# Patient Record
Sex: MALE | Race: WHITE | NOT HISPANIC OR LATINO | Employment: FULL TIME | URBAN - METROPOLITAN AREA
[De-identification: names, ages, dates, MRNs, and addresses within clinical notes are randomized per-mention and may not be internally consistent; named-entity substitution may affect disease eponyms.]

---

## 2017-03-22 ENCOUNTER — ALLSCRIPTS OFFICE VISIT (OUTPATIENT)
Dept: OTHER | Facility: OTHER | Age: 47
End: 2017-03-22

## 2018-10-29 ENCOUNTER — OFFICE VISIT (OUTPATIENT)
Dept: FAMILY MEDICINE CLINIC | Facility: CLINIC | Age: 48
End: 2018-10-29
Payer: COMMERCIAL

## 2018-10-29 VITALS
HEIGHT: 67 IN | TEMPERATURE: 97.2 F | BODY MASS INDEX: 24.01 KG/M2 | DIASTOLIC BLOOD PRESSURE: 72 MMHG | SYSTOLIC BLOOD PRESSURE: 128 MMHG | WEIGHT: 153 LBS

## 2018-10-29 DIAGNOSIS — K64.1 GRADE II HEMORRHOIDS: Primary | ICD-10-CM

## 2018-10-29 DIAGNOSIS — K64.1 GRADE II HEMORRHOIDS: ICD-10-CM

## 2018-10-29 PROCEDURE — 99213 OFFICE O/P EST LOW 20 MIN: CPT | Performed by: FAMILY MEDICINE

## 2018-10-29 RX ORDER — TRAMADOL HYDROCHLORIDE 50 MG/1
50 TABLET ORAL EVERY 8 HOURS PRN
Qty: 10 TABLET | Refills: 0 | Status: ON HOLD | OUTPATIENT
Start: 2018-10-29 | End: 2019-05-17 | Stop reason: ALTCHOICE

## 2018-10-29 RX ORDER — CEPHALEXIN 500 MG/1
500 CAPSULE ORAL EVERY 6 HOURS SCHEDULED
Qty: 14 CAPSULE | Refills: 0 | Status: SHIPPED | OUTPATIENT
Start: 2018-10-29 | End: 2018-10-29

## 2018-10-29 RX ORDER — CEPHALEXIN 500 MG/1
500 CAPSULE ORAL EVERY 6 HOURS SCHEDULED
Qty: 28 CAPSULE | Refills: 0 | Status: SHIPPED | OUTPATIENT
Start: 2018-10-29 | End: 2018-11-05

## 2018-10-29 RX ORDER — IBUPROFEN 400 MG/1
TABLET ORAL EVERY 6 HOURS PRN
Status: ON HOLD | COMMUNITY
End: 2019-05-17 | Stop reason: ALTCHOICE

## 2018-10-29 RX ORDER — TRAMADOL HYDROCHLORIDE 50 MG/1
50 TABLET ORAL EVERY 8 HOURS PRN
Qty: 10 TABLET | Refills: 0 | Status: SHIPPED | OUTPATIENT
Start: 2018-10-29 | End: 2018-10-29 | Stop reason: SDUPTHER

## 2018-10-29 NOTE — TELEPHONE ENCOUNTER
Dr Owen Nolen,    Patient called and stated that [facility] not take his type of insurance for the medication that you prescribed today  Please approved same med's for [facility], as patient requested, thank you

## 2018-10-29 NOTE — PROGRESS NOTES
Assessment/Plan:   patient will continue with Sitz baths 3 to 4 times a day  Patient use ibuprofen as directed with food  Patient use Keflex twice daily for 1 week  Patient use Anusol HC cream   Tramadol intermittently as needed  Patient will follow up as needed       Diagnoses and all orders for this visit:    Grade II hemorrhoids  -     cephalexin (KEFLEX) 500 mg capsule; Take 1 capsule (500 mg total) by mouth every 6 (six) hours for 7 days  -     hydrocortisone (ANUSOL-HC, PROCTOSOL HC,) 2 5 % rectal cream; Insert into the rectum 2 (two) times a day  -     traMADol (ULTRAM) 50 mg tablet; Take 1 tablet (50 mg total) by mouth every 8 (eight) hours as needed for moderate pain    Other orders  -     ibuprofen (MOTRIN) 400 mg tablet; Take by mouth every 6 (six) hours as needed for mild pain          Subjective:      Patient ID: Jessica Ansari is a 51 y o  male  Patient is here with perianal pain over the past 2 weeks  Patient is use preparation H as well as Epsom salt baths  No fever noted  Patient does have pain at this area  Pain is constant  Patient using ibuprofen  Pain 9/10  The following portions of the patient's history were reviewed and updated as appropriate: allergies, current medications, past family history, past medical history, past social history, past surgical history and problem list     Review of Systems   Constitutional: Negative  HENT: Negative  Eyes: Negative  Respiratory: Negative  Cardiovascular: Negative  Gastrointestinal: Negative  Endocrine: Negative  Genitourinary: Negative  Musculoskeletal: Negative  Skin: Positive for wound  Allergic/Immunologic: Negative  Neurological: Negative  Hematological: Negative  Psychiatric/Behavioral: Negative            Objective:      /72 (BP Location: Right arm)   Temp (!) 97 2 °F (36 2 °C)   Ht 5' 6 75" (1 695 m)   Wt 69 4 kg (153 lb)   BMI 24 14 kg/m²          Physical Exam   Constitutional: He is oriented to person, place, and time  He appears well-developed and well-nourished  No distress  HENT:   Head: Normocephalic  Right Ear: External ear normal    Left Ear: External ear normal    Mouth/Throat: Oropharynx is clear and moist  No oropharyngeal exudate  Eyes: Pupils are equal, round, and reactive to light  EOM are normal  Right eye exhibits no discharge  Left eye exhibits no discharge  No scleral icterus  Neck: Normal range of motion  Neck supple  No thyromegaly present  Cardiovascular: Normal rate, regular rhythm, normal heart sounds and intact distal pulses  Exam reveals no gallop and no friction rub  No murmur heard  Pulmonary/Chest: Effort normal and breath sounds normal  No respiratory distress  He has no wheezes  He has no rales  He exhibits no tenderness  Abdominal: Soft  Bowel sounds are normal  He exhibits no distension  There is tenderness  There is no rebound and no guarding  Hemorrhoid right perianal region   Musculoskeletal: Normal range of motion  He exhibits no edema or tenderness  Lymphadenopathy:     He has no cervical adenopathy  Neurological: He is oriented to person, place, and time  No cranial nerve deficit  He exhibits normal muscle tone  Coordination normal    Skin: Skin is warm and dry  No rash noted  He is not diaphoretic  No erythema  No pallor  Psychiatric: He has a normal mood and affect  His behavior is normal  Judgment and thought content normal    Nursing note and vitals reviewed

## 2018-11-26 ENCOUNTER — OFFICE VISIT (OUTPATIENT)
Dept: FAMILY MEDICINE CLINIC | Facility: CLINIC | Age: 48
End: 2018-11-26
Payer: COMMERCIAL

## 2018-11-26 ENCOUNTER — APPOINTMENT (OUTPATIENT)
Dept: RADIOLOGY | Facility: MEDICAL CENTER | Age: 48
End: 2018-11-26
Payer: COMMERCIAL

## 2018-11-26 VITALS
SYSTOLIC BLOOD PRESSURE: 120 MMHG | TEMPERATURE: 97.4 F | HEIGHT: 66 IN | BODY MASS INDEX: 24.59 KG/M2 | WEIGHT: 153 LBS | DIASTOLIC BLOOD PRESSURE: 72 MMHG

## 2018-11-26 DIAGNOSIS — S43.004A SHOULDER SEPARATION, RIGHT, INITIAL ENCOUNTER: ICD-10-CM

## 2018-11-26 DIAGNOSIS — S43.004A SHOULDER SEPARATION, RIGHT, INITIAL ENCOUNTER: Primary | ICD-10-CM

## 2018-11-26 PROCEDURE — 73030 X-RAY EXAM OF SHOULDER: CPT

## 2018-11-26 PROCEDURE — 99213 OFFICE O/P EST LOW 20 MIN: CPT | Performed by: FAMILY MEDICINE

## 2018-11-26 PROCEDURE — 1036F TOBACCO NON-USER: CPT | Performed by: FAMILY MEDICINE

## 2018-11-26 PROCEDURE — 3008F BODY MASS INDEX DOCD: CPT | Performed by: FAMILY MEDICINE

## 2018-11-26 NOTE — PROGRESS NOTES
Assessment/Plan:  Patient will use Advil twice daily  Patient will use heat as directed  Patient will go for x-ray  To consider physical therapy if not improving  Patient will follow up in 3-4 weeks if not improving       Diagnoses and all orders for this visit:    Shoulder separation, right, initial encounter  -     Ambulatory referral to Physical Therapy; Future  -     XR shoulder 2+ vw right; Future          Subjective:      Patient ID: Shivani Cates is a 51 y o  male  Patient is here with right shoulder pain posteriorly status post getting tangled up with dog roughly 6 days ago  No loss of consciousness or neck pain note, chest pain or shortness of breath  Patient has abduction roughly 90°  Patient also with restriction posteriorly the the as well as with adduction  Patient using Advil and lidocaine patches  Shoulder Pain          The following portions of the patient's history were reviewed and updated as appropriate: allergies, current medications, past family history, past medical history, past social history, past surgical history and problem list     Review of Systems   Constitutional: Negative  HENT: Negative  Eyes: Negative  Respiratory: Negative  Cardiovascular: Negative  Gastrointestinal: Negative  Endocrine: Negative  Genitourinary: Negative  Musculoskeletal: Positive for arthralgias  Skin: Negative  Allergic/Immunologic: Negative  Neurological: Negative  Hematological: Negative  Psychiatric/Behavioral: Negative  Objective:      /72 (BP Location: Right arm, Patient Position: Sitting, Cuff Size: Standard)   Temp (!) 97 4 °F (36 3 °C) (Tympanic)   Ht 5' 6" (1 676 m)   Wt 69 4 kg (153 lb)   BMI 24 69 kg/m²          Physical Exam   Constitutional: He appears well-developed and well-nourished  Cardiovascular: Normal rate and regular rhythm      Pulmonary/Chest: Effort normal and breath sounds normal    Musculoskeletal: He exhibits tenderness  Pain with palpation over the distal clavicle on the right  [facility]  Possible separation  Patient with decreased abduction 90°  Patient with decreased external rotation and internal rotation due to pain  Nursing note and vitals reviewed

## 2018-11-27 ENCOUNTER — TELEPHONE (OUTPATIENT)
Dept: FAMILY MEDICINE CLINIC | Facility: CLINIC | Age: 48
End: 2018-11-27

## 2018-11-27 NOTE — TELEPHONE ENCOUNTER
----- Message from Barry Valentine DO sent at 11/27/2018  8:04 AM EST -----  Call patient  X-ray shows some swelling and mild arthritis of the [facility] joint  Recommend medications and ice as previously discussed at visit

## 2019-02-27 DIAGNOSIS — K64.1 GRADE II HEMORRHOIDS: ICD-10-CM

## 2019-02-27 NOTE — TELEPHONE ENCOUNTER
Pt wants to know if you could prescribe him a medication for his hemorrhoids again  He is currently uninsured and doesn't have the money for both office visit and medication  Can we send in another prescription? I said it was up to you   Please address

## 2019-02-28 ENCOUNTER — TELEPHONE (OUTPATIENT)
Dept: FAMILY MEDICINE CLINIC | Facility: CLINIC | Age: 49
End: 2019-02-28

## 2019-03-04 ENCOUNTER — HOSPITAL ENCOUNTER (EMERGENCY)
Facility: HOSPITAL | Age: 49
Discharge: HOME | End: 2019-03-04
Attending: EMERGENCY MEDICINE

## 2019-03-04 ENCOUNTER — APPOINTMENT (EMERGENCY)
Dept: CT IMAGING | Facility: HOSPITAL | Age: 49
End: 2019-03-04

## 2019-03-04 VITALS
TEMPERATURE: 97.7 F | OXYGEN SATURATION: 99 % | HEART RATE: 107 BPM | DIASTOLIC BLOOD PRESSURE: 94 MMHG | SYSTOLIC BLOOD PRESSURE: 155 MMHG | BODY MASS INDEX: 24.87 KG/M2 | RESPIRATION RATE: 20 BRPM | WEIGHT: 154.1 LBS

## 2019-03-04 DIAGNOSIS — K61.0 PERIANAL ABSCESS: Primary | ICD-10-CM

## 2019-03-04 LAB
ANION GAP BLD CALC-SCNC: 17 MMOL/L (ref 4–13)
BUN BLD-MCNC: 17 MG/DL (ref 5–25)
CA-I BLD-SCNC: 1.19 MMOL/L (ref 1.12–1.32)
CHLORIDE BLD-SCNC: 100 MMOL/L (ref 100–108)
CREAT BLD-MCNC: 1.2 MG/DL (ref 0.6–1.3)
GFR SERPL CREATININE-BSD FRML MDRD: 71 ML/MIN/1.73SQ M
GLUCOSE SERPL-MCNC: 113 MG/DL (ref 65–140)
HCT VFR BLD CALC: 49 % (ref 36.5–49.3)
HGB BLDA-MCNC: 16.7 G/DL (ref 12–17)
PCO2 BLD: 29 MMOL/L (ref 21–32)
POTASSIUM BLD-SCNC: 4.2 MMOL/L (ref 3.5–5.3)
SODIUM BLD-SCNC: 141 MMOL/L (ref 136–145)
SPECIMEN SOURCE: ABNORMAL

## 2019-03-04 PROCEDURE — 80047 BASIC METABLC PNL IONIZED CA: CPT

## 2019-03-04 PROCEDURE — 72193 CT PELVIS W/DYE: CPT

## 2019-03-04 PROCEDURE — 85014 HEMATOCRIT: CPT

## 2019-03-04 PROCEDURE — 99284 EMERGENCY DEPT VISIT MOD MDM: CPT

## 2019-03-04 RX ORDER — CEPHALEXIN 500 MG/1
500 CAPSULE ORAL EVERY 8 HOURS SCHEDULED
COMMUNITY
End: 2019-03-06

## 2019-03-04 RX ORDER — LIDOCAINE HYDROCHLORIDE 10 MG/ML
5 INJECTION, SOLUTION EPIDURAL; INFILTRATION; INTRACAUDAL; PERINEURAL ONCE
Status: COMPLETED | OUTPATIENT
Start: 2019-03-04 | End: 2019-03-04

## 2019-03-04 RX ORDER — SULFAMETHOXAZOLE AND TRIMETHOPRIM 800; 160 MG/1; MG/1
1 TABLET ORAL 2 TIMES DAILY
Qty: 14 TABLET | Refills: 0 | Status: SHIPPED | OUTPATIENT
Start: 2019-03-04 | End: 2019-03-11

## 2019-03-04 RX ADMIN — LIDOCAINE HYDROCHLORIDE 5 ML: 10 INJECTION, SOLUTION EPIDURAL; INFILTRATION; INTRACAUDAL; PERINEURAL at 09:15

## 2019-03-04 RX ADMIN — IOHEXOL 100 ML: 350 INJECTION, SOLUTION INTRAVENOUS at 08:47

## 2019-03-04 NOTE — ED PROVIDER NOTES
History  Chief Complaint   Patient presents with    Perineal Abscess     started last monday, called family doc Thursday and was started on antibiotics  denies fever  41-year-old male with no significant past history presents for evaluation of an abscess over the anus for past 1 week  Patient reports that he has tenderness touch which worsens when he walks  Patient reports that he has had this in the past before and was treated with Keflex which resolved his symptoms  Patient reports that he called his family care provider 4 days ago and was prescribed Keflex  Patient reports that he did not notice much improvement this time with his symptoms  He denies any drainage of pus or blood  He reports chills  He has loose stools but denies any tarry or bloody stools  Denies any abdominal pain, dysuria hematuria urinary frequency, urgency, testicular pain, nausea or vomiting  Prior to Admission Medications   Prescriptions Last Dose Informant Patient Reported? Taking?   hydrocortisone (ANUSOL-HC, PROCTOSOL HC,) 2 5 % rectal cream Not Taking at Unknown time Self No No   Sig: Insert into the rectum 2 (two) times a day   Patient not taking: Reported on 3/6/2019   ibuprofen (MOTRIN) 400 mg tablet 3/3/2019 at Unknown time Self Yes Yes   Sig: Take by mouth every 6 (six) hours as needed for mild pain   traMADol (ULTRAM) 50 mg tablet Not Taking at Unknown time Self No No   Sig: Take 1 tablet (50 mg total) by mouth every 8 (eight) hours as needed for moderate pain   Patient not taking: Reported on 11/26/2018       Facility-Administered Medications: None       Past Medical History:   Diagnosis Date    Benign neoplasm of epididymis     Two cysts in the epididymal head, found on US  Resolved 10/11/2010        History reviewed  No pertinent surgical history  History reviewed  No pertinent family history  I have reviewed and agree with the history as documented      Social History     Tobacco Use    Smoking status: Former Smoker    Smokeless tobacco: Never Used   Substance Use Topics    Alcohol use: Not Currently    Drug use: No        Review of Systems   Constitutional: Positive for chills  Negative for fever  Gastrointestinal: Negative for abdominal pain, constipation, diarrhea, nausea and vomiting  Genitourinary: Negative for decreased urine volume, difficulty urinating, discharge, dysuria, frequency, genital sores, hematuria, penile pain, penile swelling, scrotal swelling, testicular pain and urgency  Skin: Positive for color change  abscess   Neurological: Negative for weakness and numbness  Physical Exam  Physical Exam   Constitutional: He appears well-developed and well-nourished  No distress  Cardiovascular: Normal rate and normal heart sounds  Pulmonary/Chest: Effort normal and breath sounds normal    Abdominal: Soft  Bowel sounds are normal  He exhibits no mass  Genitourinary: Penis normal  Rectal exam shows tenderness  Rectal exam shows no external hemorrhoid and no fissure  Musculoskeletal: Normal range of motion  Neurological: He is alert  Skin: Skin is warm  Capillary refill takes less than 2 seconds  He is not diaphoretic  There is erythema  Vitals reviewed        Vital Signs  ED Triage Vitals [03/04/19 0734]   Temperature Pulse Respirations Blood Pressure SpO2   97 7 °F (36 5 °C) 73 20 159/89 99 %      Temp Source Heart Rate Source Patient Position - Orthostatic VS BP Location FiO2 (%)   Oral Monitor Sitting Right arm --      Pain Score       Worst Possible Pain           Vitals:    03/04/19 0734 03/04/19 0933   BP: 159/89 155/94   Pulse: 73 (!) 107   Patient Position - Orthostatic VS: Sitting Standing       Visual Acuity      ED Medications  Medications   lidocaine (PF) (XYLOCAINE-MPF) 1 % injection 5 mL (5 mL Infiltration Given 3/4/19 0915)   iohexol (OMNIPAQUE) 350 MG/ML injection (MULTI-DOSE) 100 mL (100 mL Intravenous Given 3/4/19 0847)       Diagnostic Studies  Results Reviewed     Procedure Component Value Units Date/Time    POCT Chem 8+ [[id number]]  (Abnormal) Collected:  03/04/19 0814    Lab Status:  Final result Specimen:  Venous Updated:  03/04/19 0819     SODIUM, I-STAT 141 mmol/l      Potassium, i-STAT 4 2 mmol/L      Chloride, istat 100 mmol/L      CO2, i-STAT 29 mmol/L      Anion Gap, i-STAT 17 mmol/L      Calcium, Ionized i-STAT 1 19 mmol/L      BUN, I-STAT 17 mg/dl      Creatinine, i-STAT 1 2 mg/dl      eGFR 71 ml/min/1 73sq m      Glucose, i-STAT 113 mg/dl      Hct, i-STAT 49 %      Hgb, i-STAT 16 7 g/dl      Specimen Type VENOUS                 CT pelvis w contrast   Final Result by Jay Garcia MD (03/04 0900)      Perianal abscess as described  Workstation performed: [id number]                    Procedures  Incision/Drainage  Date/Time: 3/4/2019 9:10 AM  Performed by: Javon Medina PA-C  Authorized by: Javon Medina PA-C     Patient location:  ED  Other Assisting Provider: No    Consent:     Consent obtained:  Verbal    Consent given by:  Patient    Risks discussed:  Bleeding, incomplete drainage, infection and pain  Universal protocol:     Patient identity confirmed:  Verbally with patient  Location:     Type:  Abscess    Location:  Anogenital    Anogenital location:  Perianal  Pre-procedure details:     Skin preparation:  Betadine  Anesthesia (see MAR for exact dosages): Anesthesia method:  Local infiltration    Local anesthetic:  Lidocaine 1% w/o epi  Procedure details:     Incision types:  Stab incision    Scalpel blade:  11    Approach:  Open    Incision depth:  Subcutaneous    Drainage:  Purulent    Drainage amount: Moderate    Wound treatment:  Wound left open    Packing materials:  None  Post-procedure details:     Patient tolerance of procedure:   Tolerated well, no immediate complications           Phone Contacts  ED Phone Contact    ED Course                               MDM  Number of Diagnoses or Management Options  Perianal abscess:   Diagnosis management comments: 69-year-old male presents for evaluation of a perirectal abscess for the past 1 week  Patient is well-appearing, vital stable  Did have approximately 2 cm fluctuant abscess around the rectum  Will CT and drain abscess  CT scan shows two perianal abscesses  Unable to visualize both abscesses  Will drain visual abscess and advised warm compresses  Will advise follow-up with the PCP in 2 days for wound recheck  Disposition  Final diagnoses:   Perianal abscess     Time reflects when diagnosis was documented in both MDM as applicable and the Disposition within this note     Time User Action Codes Description Comment    3/4/2019  9:27 AM [facility] [place] Add [K61 0] Perianal abscess       ED Disposition     ED Disposition Condition Date/Time Comment    Discharge Stable Mon Mar 4, 2019  9:27 AM Madelyn Thomas discharge to home/self care              Follow-up Information     Follow up With Specialties Details Why Contact Zoë Mclean DO Family Medicine Schedule an appointment as soon as possible for a visit in 2 days For wound re-check [address]  [phone]            Discharge Medication List as of 3/4/2019  9:28 AM      START taking these medications    Details   sulfamethoxazole-trimethoprim (BACTRIM DS) 800-160 mg per tablet Take 1 tablet by mouth 2 (two) times a day for 7 days smx-tmp DS (BACTRIM) 800-160 mg tabs (1tab q12 D10), Starting Mon 3/4/2019, Until Mon 3/11/2019, Print         CONTINUE these medications which have NOT CHANGED    Details   ibuprofen (MOTRIN) 400 mg tablet Take by mouth every 6 (six) hours as needed for mild pain, Historical Med      cephalexin (KEFLEX) 500 mg capsule Take 500 mg by mouth every 8 (eight) hours, Historical Med      hydrocortisone (ANUSOL-HC, PROCTOSOL HC,) 2 5 % rectal cream Insert into the rectum 2 (two) times a day, Starting u 2/28/2019, Normal      traMADol (ULTRAM) 50 mg tablet Take 1 tablet (50 mg total) by mouth every 8 (eight) hours as needed for moderate pain, Starting Mon 10/29/2018, Normal           No discharge procedures on file      ED Provider  Electronically Signed by           Vicki Faust PA-C  03/07/19 0033

## 2019-03-06 ENCOUNTER — OFFICE VISIT (OUTPATIENT)
Dept: FAMILY MEDICINE CLINIC | Facility: CLINIC | Age: 49
End: 2019-03-06

## 2019-03-06 VITALS
TEMPERATURE: 99.1 F | DIASTOLIC BLOOD PRESSURE: 80 MMHG | SYSTOLIC BLOOD PRESSURE: 110 MMHG | HEIGHT: 66 IN | RESPIRATION RATE: 16 BRPM | OXYGEN SATURATION: 99 % | WEIGHT: 157.8 LBS | HEART RATE: 86 BPM | BODY MASS INDEX: 25.36 KG/M2

## 2019-03-06 DIAGNOSIS — Z23 ENCOUNTER FOR VACCINATION: Primary | ICD-10-CM

## 2019-03-06 DIAGNOSIS — K61.0 PERIANAL ABSCESS: ICD-10-CM

## 2019-03-06 PROCEDURE — 99213 OFFICE O/P EST LOW 20 MIN: CPT | Performed by: FAMILY MEDICINE

## 2019-03-06 NOTE — PROGRESS NOTES
Assessment/Plan:  Patient will continue with current regimen of heat, Keflex, Bactrim  Guidance given overall  Patient may consider seeing colorectal specialist if symptoms would reoccur     Diagnoses and all orders for this visit:    Encounter for vaccination  -     Tdap vaccine greater than or equal to 8yo IM  -     Flu vaccine greater than or equal to 4yo preservative free IM          Subjective:      Patient ID: Ly Freeman is a 51 y o  male  Patient here to follow-up on perianal abscess  Patient status post ER with incision and drainage done on the 4th  Patient is on Bactrim as well as Keflex  Patient is using warm soaks  The no fevers or chills  Patient does have some drainage at this time  The following portions of the patient's history were reviewed and updated as appropriate: allergies, current medications, past family history, past medical history, past social history, past surgical history and problem list     Review of Systems   Constitutional: Negative  HENT: Negative  Eyes: Negative  Respiratory: Negative  Cardiovascular: Negative  Gastrointestinal: Negative  Endocrine: Negative  Genitourinary: Negative  Musculoskeletal: Negative  Skin: Positive for wound  Allergic/Immunologic: Negative  Neurological: Negative  Hematological: Negative  Psychiatric/Behavioral: Negative  Objective:      /80 (BP Location: Right arm, Patient Position: Sitting, Cuff Size: Adult)   Pulse 86   Temp 99 1 °F (37 3 °C) (Tympanic)   Resp 16   Ht 5' 6" (1 676 m)   Wt 71 6 kg (157 lb 12 8 oz)   SpO2 99%   BMI 25 47 kg/m²          Physical Exam   Constitutional: He appears well-developed and well-nourished  Skin:   A resolving abscess perianally   Nursing note and vitals reviewed

## 2019-04-08 ENCOUNTER — OFFICE VISIT (OUTPATIENT)
Dept: FAMILY MEDICINE CLINIC | Facility: CLINIC | Age: 49
End: 2019-04-08
Payer: COMMERCIAL

## 2019-04-08 VITALS
SYSTOLIC BLOOD PRESSURE: 112 MMHG | HEIGHT: 66 IN | WEIGHT: 158.2 LBS | DIASTOLIC BLOOD PRESSURE: 74 MMHG | BODY MASS INDEX: 25.43 KG/M2 | TEMPERATURE: 98.3 F

## 2019-04-08 DIAGNOSIS — K61.0 PERIANAL ABSCESS: Primary | ICD-10-CM

## 2019-04-08 PROCEDURE — 3008F BODY MASS INDEX DOCD: CPT | Performed by: FAMILY MEDICINE

## 2019-04-08 PROCEDURE — 99213 OFFICE O/P EST LOW 20 MIN: CPT | Performed by: FAMILY MEDICINE

## 2019-04-08 PROCEDURE — 1036F TOBACCO NON-USER: CPT | Performed by: FAMILY MEDICINE

## 2019-04-08 RX ORDER — CEPHALEXIN 500 MG/1
500 CAPSULE ORAL EVERY 12 HOURS SCHEDULED
Qty: 20 CAPSULE | Refills: 0 | Status: SHIPPED | OUTPATIENT
Start: 2019-04-08 | End: 2019-04-18

## 2019-05-16 ENCOUNTER — ANESTHESIA EVENT (OUTPATIENT)
Dept: PERIOP | Facility: HOSPITAL | Age: 49
End: 2019-05-16
Payer: COMMERCIAL

## 2019-05-17 ENCOUNTER — HOSPITAL ENCOUNTER (OUTPATIENT)
Facility: HOSPITAL | Age: 49
Setting detail: OUTPATIENT SURGERY
Discharge: HOME | End: 2019-05-17
Attending: COLON & RECTAL SURGERY | Admitting: COLON & RECTAL SURGERY
Payer: COMMERCIAL

## 2019-05-17 ENCOUNTER — ANESTHESIA (OUTPATIENT)
Dept: PERIOP | Facility: HOSPITAL | Age: 49
End: 2019-05-17
Payer: COMMERCIAL

## 2019-05-17 VITALS
TEMPERATURE: 97.4 F | RESPIRATION RATE: 19 BRPM | WEIGHT: 155 LBS | DIASTOLIC BLOOD PRESSURE: 73 MMHG | HEART RATE: 66 BPM | HEIGHT: 68 IN | SYSTOLIC BLOOD PRESSURE: 115 MMHG | OXYGEN SATURATION: 100 % | BODY MASS INDEX: 23.49 KG/M2

## 2019-05-17 DIAGNOSIS — K61.0 PERIANAL ABSCESS: Primary | ICD-10-CM

## 2019-05-17 RX ORDER — MAGNESIUM HYDROXIDE 1200 MG/15ML
LIQUID ORAL AS NEEDED
Status: DISCONTINUED | OUTPATIENT
Start: 2019-05-17 | End: 2019-05-17 | Stop reason: HOSPADM

## 2019-05-17 RX ORDER — PROPOFOL 10 MG/ML
INJECTION, EMULSION INTRAVENOUS AS NEEDED
Status: DISCONTINUED | OUTPATIENT
Start: 2019-05-17 | End: 2019-05-17 | Stop reason: SURG

## 2019-05-17 RX ORDER — SODIUM CHLORIDE, SODIUM LACTATE, POTASSIUM CHLORIDE, CALCIUM CHLORIDE 600; 310; 30; 20 MG/100ML; MG/100ML; MG/100ML; MG/100ML
50 INJECTION, SOLUTION INTRAVENOUS CONTINUOUS
Status: DISCONTINUED | OUTPATIENT
Start: 2019-05-17 | End: 2019-05-17 | Stop reason: HOSPADM

## 2019-05-17 RX ORDER — OXYCODONE HYDROCHLORIDE AND ACETAMINOPHEN 5; 325 MG/1; MG/1
1 TABLET ORAL EVERY 6 HOURS PRN
Qty: 30 TABLET | Refills: 0 | Status: SHIPPED | OUTPATIENT
Start: 2019-05-17 | End: 2019-05-27

## 2019-05-17 RX ORDER — LIDOCAINE HYDROCHLORIDE 10 MG/ML
INJECTION, SOLUTION INFILTRATION; PERINEURAL AS NEEDED
Status: DISCONTINUED | OUTPATIENT
Start: 2019-05-17 | End: 2019-05-17 | Stop reason: SURG

## 2019-05-17 RX ORDER — FENTANYL CITRATE 50 UG/ML
INJECTION, SOLUTION INTRAMUSCULAR; INTRAVENOUS AS NEEDED
Status: DISCONTINUED | OUTPATIENT
Start: 2019-05-17 | End: 2019-05-17 | Stop reason: SURG

## 2019-05-17 RX ORDER — OXYCODONE HYDROCHLORIDE AND ACETAMINOPHEN 5; 325 MG/1; MG/1
1 TABLET ORAL EVERY 4 HOURS PRN
Status: DISCONTINUED | OUTPATIENT
Start: 2019-05-17 | End: 2019-05-17 | Stop reason: HOSPADM

## 2019-05-17 RX ORDER — MIDAZOLAM HYDROCHLORIDE 1 MG/ML
INJECTION INTRAMUSCULAR; INTRAVENOUS AS NEEDED
Status: DISCONTINUED | OUTPATIENT
Start: 2019-05-17 | End: 2019-05-17 | Stop reason: SURG

## 2019-05-17 RX ORDER — PROPOFOL 10 MG/ML
INJECTION, EMULSION INTRAVENOUS CONTINUOUS PRN
Status: DISCONTINUED | OUTPATIENT
Start: 2019-05-17 | End: 2019-05-17 | Stop reason: SURG

## 2019-05-17 RX ADMIN — FENTANYL CITRATE 50 MCG: 50 INJECTION INTRAMUSCULAR; INTRAVENOUS at 11:29

## 2019-05-17 RX ADMIN — LIDOCAINE HYDROCHLORIDE 50 MG: 10 INJECTION, SOLUTION INFILTRATION; PERINEURAL at 11:32

## 2019-05-17 RX ADMIN — PROPOFOL 75 MCG/KG/MIN: 10 INJECTION, EMULSION INTRAVENOUS at 11:37

## 2019-05-17 RX ADMIN — SODIUM CHLORIDE, POTASSIUM CHLORIDE, SODIUM LACTATE AND CALCIUM CHLORIDE 50 ML/HR: 600; 310; 30; 20 INJECTION, SOLUTION INTRAVENOUS at 11:05

## 2019-05-17 RX ADMIN — MIDAZOLAM HYDROCHLORIDE 2 MG: 1 INJECTION, SOLUTION INTRAMUSCULAR; INTRAVENOUS at 11:29

## 2019-05-17 RX ADMIN — PROPOFOL 120 MG: 10 INJECTION, EMULSION INTRAVENOUS at 11:32

## 2019-05-17 RX ADMIN — PROPOFOL 50 MG: 10 INJECTION, EMULSION INTRAVENOUS at 11:40

## 2020-03-19 ENCOUNTER — OFFICE VISIT (OUTPATIENT)
Dept: UROLOGY | Facility: CLINIC | Age: 50
End: 2020-03-19
Payer: COMMERCIAL

## 2020-03-19 VITALS
WEIGHT: 161.3 LBS | DIASTOLIC BLOOD PRESSURE: 76 MMHG | SYSTOLIC BLOOD PRESSURE: 112 MMHG | HEIGHT: 68 IN | HEART RATE: 70 BPM | BODY MASS INDEX: 24.44 KG/M2

## 2020-03-19 DIAGNOSIS — R82.998 DARK URINE: Primary | ICD-10-CM

## 2020-03-19 DIAGNOSIS — R31.0 GROSS HEMATURIA: ICD-10-CM

## 2020-03-19 LAB
SL AMB  POCT GLUCOSE, UA: NORMAL
SL AMB LEUKOCYTE ESTERASE,UA: NORMAL
SL AMB POCT BILIRUBIN,UA: NORMAL
SL AMB POCT BLOOD,UA: NORMAL
SL AMB POCT CLARITY,UA: CLEAR
SL AMB POCT COLOR,UA: YELLOW
SL AMB POCT KETONES,UA: NORMAL
SL AMB POCT NITRITE,UA: NORMAL
SL AMB POCT PH,UA: 6.5
SL AMB POCT SPECIFIC GRAVITY,UA: 1
SL AMB POCT URINE PROTEIN: NORMAL
SL AMB POCT UROBILINOGEN: 0.2

## 2020-03-19 PROCEDURE — 3008F BODY MASS INDEX DOCD: CPT | Performed by: PHYSICIAN ASSISTANT

## 2020-03-19 PROCEDURE — 1036F TOBACCO NON-USER: CPT | Performed by: PHYSICIAN ASSISTANT

## 2020-03-19 PROCEDURE — 99213 OFFICE O/P EST LOW 20 MIN: CPT | Performed by: PHYSICIAN ASSISTANT

## 2020-03-19 PROCEDURE — 81002 URINALYSIS NONAUTO W/O SCOPE: CPT | Performed by: PHYSICIAN ASSISTANT

## 2020-03-19 NOTE — PROGRESS NOTES
3/19/2020      Chief Complaint   Patient presents with    dark urine    Gross Hematuria         Assessment and Plan    47 y o  male managed by Dr Guicho Castaneda    1  Gross hematuria  - urine dip unremarkable, specifically no blood  - CT urogram 2016 with left ureterocele, left hydroureter, and 7mm left renal calculus  - repeat CT urogram  - schedule cystoscopy    Rare and intermittent but nonetheless persistent gross hematuria over the past few years, most recent episode last month  I recommend we repeat the CT urogram at this point to reidentify the left ureterocele and hydroureter as well as any new or suspicious findings  I also recommend cystoscopy in light of tobacco use  Patient understands and agrees to plan  History of Present Illness  Carina Jaeger is a 47 y o  male here for evaluation of new patient/re-establish care for evaluation of gross hematuria  In the past two months he has had three total episodes of red urine   This is happen additional 3-4 times over the past four years since he was last seen by us in 2016 for the same thing  Typically occurs AM first morning void then clears by afternoon and resolves within 1 day  Normal and strong stream  No dysuria  On further chart review patient did have one previous microscopic urinalysis back in the summer of 2016 with 10-20 RBCs, 2-4 wbcs, and urine culture was negative  He did undergo a CT renal scan which demonstrated some low left hydroureter and a low 10 mm left ureterocele, as well as a 7 mm left renal stone that was nonobstructing  Bladder looks normal and the right collecting system looked normal at that time  They are considering a a nuclear renal scan to further assess the left hydroureter and ureterocele to see if incision would be necessary  He ultimately never underwent the scan and was lost to follow-up  He has an occasional tobacco user, weekly not daily  With no other known chemical exposures    No prior cystoscopy  Review of Systems   Constitutional: Negative  Respiratory: Negative  Cardiovascular: Negative  Genitourinary: Positive for hematuria  Negative for decreased urine volume, difficulty urinating, dysuria, flank pain, frequency and urgency  Musculoskeletal: Negative  Urinary Incontinence Screening      Most Recent Value   Urinary Incontinence   Urinary Incontinence? No   Incomplete emptying? No   Urinary frequency? No   Urinary urgency? No   Urinary hesitancy? No   Dysuria (painful difficult urination)? No   Nocturia (waking up to use the bathroom)? No   Straining (having to push to go)? No   Weak stream?  No   Intermittent stream?  No   Post void dribbling? Yes               Past Medical History  Past Medical History:   Diagnosis Date    Benign neoplasm of epididymis     Two cysts in the epididymal head, found on US  Resolved 10/11/2010     Hemorrhoids     Kidney stone     Shoulder separation, right, initial encounter 2018     Past Social History  Past Surgical History:   Procedure Laterality Date    DE SURG DIAGNOSTIC EXAM, ANORECTAL N/A 5/17/2019    Procedure: Clementina ENCINAS;  Surgeon: Jarocho Mason MD;  Location: [address] [place] [address];  Service: Colorectal    WISDOM TOOTH EXTRACTION       Social History     Tobacco Use   Smoking Status Former Smoker   Smokeless Tobacco Never Used     Past Family History  History reviewed  No pertinent family history    Past Social history  Social History     Socioeconomic History    Marital status:      Spouse name: Not on file    Number of children: Not on file    Years of education: Not on file    Highest education level: Not on file   Occupational History    Not on file   Social Needs    Financial resource strain: Not on file    Food insecurity:     Worry: Not on file     Inability: Not on file    Transportation needs:     Medical: Not on file     Non-medical: Not on file   Tobacco Use    Smoking status: Former Smoker  Smokeless tobacco: Never Used   Substance and Sexual Activity    Alcohol use: Not Currently    Drug use: No    Sexual activity: Not Currently   Lifestyle    Physical activity:     Days per week: Not on file     Minutes per session: Not on file    Stress: Not on file   Relationships    Social connections:     Talks on phone: Not on file     Gets together: Not on file     Attends Oriental orthodox service: Not on file     Active member of club or organization: Not on file     Attends meetings of clubs or organizations: Not on file     Relationship status: Not on file    Intimate partner violence:     Fear of current or ex partner: Not on file     Emotionally abused: Not on file     Physically abused: Not on file     Forced sexual activity: Not on file   Other Topics Concern    Not on file   Social History Narrative    Cigars (__ a day) - As per [employer]    Alcohol use (history) -  As per [employer]      Current Medications  No current outpatient medications on file  No current facility-administered medications for this visit  Allergies  No Known Allergies      The following portions of the patient's history were reviewed and updated as appropriate: allergies, current medications, past medical history, past social history, past surgical history and problem list       Vitals  Vitals:    03/19/20 1319   BP: 112/76   BP Location: Left arm   Patient Position: Sitting   Cuff Size: Adult   Pulse: 70   Weight: 73 2 kg (161 lb 4 8 oz)   Height: 5' 8" (1 727 m)       Physical Exam   Constitutional: He is oriented to person, place, and time  He appears well-developed and well-nourished  No distress  HENT:   Head: Normocephalic and atraumatic  Pulmonary/Chest: Effort normal    Musculoskeletal: He exhibits no edema  Neurological: He is alert and oriented to person, place, and time  Gait normal    Skin: Skin is warm and dry  He is not diaphoretic  Psychiatric: He has a normal mood and affect   His speech is normal and behavior is normal    Nursing note and vitals reviewed  Results  No results found for this or any previous visit (from the past 1 hour(s))  ]  No results found for: PSA  Lab Results   Component Value Date    GLUCOSE 113 03/04/2019    CALCIUM 9 0 08/03/2016    K 4 3 08/03/2016    CO2 29 03/04/2019     08/03/2016    BUN 16 08/03/2016    CREATININE 1 17 08/03/2016     Lab Results   Component Value Date    HGB 16 7 03/04/2019    HCT 49 03/04/2019         Orders  Orders Placed This Encounter   Procedures    CT renal protocol     Standing Status:   Future     Standing Expiration Date:   9/19/2020     Scheduling Instructions:      Nothing to eat 3 hours prior to your test   Clear liquids are also permitted up until the time of the scan  Clear liquids include water, black coffee or tea, apple juice or clear broth  If possible wear clothing without any metal in the abdomen area  Sweat suit, shorts, sports bra or bra without       underwire may eliminate the need to change  Please bring your insurance cards, a form of photo ID and a list of your medications with you  Arrive 15 minutes prior to your appointment time in order to register  On the day of your test, please bring any prior CT or MRI studies of this area with you       that were not performed at a [facility]  To schedule this appointment, please contact Central Scheduling at [phone]  Order Specific Question:   What is the patient's sedation requirement? Answer:   No Sedation    Basic metabolic panel     This is a patient instruction: Patient fasting for 8 hours or longer recommended       Standing Status:   Future     Standing Expiration Date:   3/19/2021    POCT urine dip    Cystoscopy     Standing Status:   Future     Standing Expiration Date:   3/19/2021

## 2020-04-10 ENCOUNTER — TELEPHONE (OUTPATIENT)
Dept: UROLOGY | Facility: AMBULATORY SURGERY CENTER | Age: 50
End: 2020-04-10

## 2020-06-24 ENCOUNTER — TELEPHONE (OUTPATIENT)
Dept: UROLOGY | Facility: CLINIC | Age: 50
End: 2020-06-24

## 2020-06-24 NOTE — TELEPHONE ENCOUNTER
Left message for patient to call back, would [facility]

## 2020-08-18 ENCOUNTER — TELEPHONE (OUTPATIENT)
Dept: UROLOGY | Facility: CLINIC | Age: 50
End: 2020-08-18

## 2020-08-18 DIAGNOSIS — R31.0 GROSS HEMATURIA: Primary | ICD-10-CM

## 2020-12-18 ENCOUNTER — HOSPITAL ENCOUNTER (OUTPATIENT)
Dept: GASTROENTEROLOGY | Facility: HOSPITAL | Age: 50
Setting detail: OUTPATIENT SURGERY
Discharge: HOME | End: 2020-12-18
Attending: COLON & RECTAL SURGERY
Payer: COMMERCIAL

## 2020-12-18 ENCOUNTER — ANESTHESIA EVENT (OUTPATIENT)
Dept: GASTROENTEROLOGY | Facility: HOSPITAL | Age: 50
End: 2020-12-18

## 2020-12-18 ENCOUNTER — ANESTHESIA (OUTPATIENT)
Dept: GASTROENTEROLOGY | Facility: HOSPITAL | Age: 50
End: 2020-12-18

## 2020-12-18 VITALS
RESPIRATION RATE: 18 BRPM | SYSTOLIC BLOOD PRESSURE: 119 MMHG | TEMPERATURE: 98.1 F | HEART RATE: 72 BPM | DIASTOLIC BLOOD PRESSURE: 84 MMHG | WEIGHT: 161 LBS | HEIGHT: 68 IN | OXYGEN SATURATION: 97 % | BODY MASS INDEX: 24.4 KG/M2

## 2020-12-18 VITALS — HEART RATE: 67 BPM

## 2020-12-18 DIAGNOSIS — Z12.11 ENCOUNTER FOR SCREENING FOR MALIGNANT NEOPLASM OF COLON: ICD-10-CM

## 2020-12-18 PROCEDURE — 88305 TISSUE EXAM BY PATHOLOGIST: CPT | Performed by: PATHOLOGY

## 2020-12-18 RX ORDER — SODIUM CHLORIDE 9 MG/ML
125 INJECTION, SOLUTION INTRAVENOUS CONTINUOUS
Status: DISCONTINUED | OUTPATIENT
Start: 2020-12-18 | End: 2020-12-22 | Stop reason: HOSPADM

## 2020-12-18 RX ORDER — LIDOCAINE HYDROCHLORIDE 10 MG/ML
INJECTION, SOLUTION EPIDURAL; INFILTRATION; INTRACAUDAL; PERINEURAL AS NEEDED
Status: DISCONTINUED | OUTPATIENT
Start: 2020-12-18 | End: 2020-12-18

## 2020-12-18 RX ORDER — PROPOFOL 10 MG/ML
INJECTION, EMULSION INTRAVENOUS AS NEEDED
Status: DISCONTINUED | OUTPATIENT
Start: 2020-12-18 | End: 2020-12-18

## 2020-12-18 RX ADMIN — PROPOFOL 50 MG: 10 INJECTION, EMULSION INTRAVENOUS at 10:19

## 2020-12-18 RX ADMIN — PROPOFOL 50 MG: 10 INJECTION, EMULSION INTRAVENOUS at 10:21

## 2020-12-18 RX ADMIN — PROPOFOL 100 MG: 10 INJECTION, EMULSION INTRAVENOUS at 10:12

## 2020-12-18 RX ADMIN — PROPOFOL 50 MG: 10 INJECTION, EMULSION INTRAVENOUS at 10:14

## 2020-12-18 RX ADMIN — LIDOCAINE HYDROCHLORIDE 50 MG: 10 INJECTION, SOLUTION EPIDURAL; INFILTRATION; INTRACAUDAL; PERINEURAL at 10:12

## 2020-12-18 RX ADMIN — SODIUM CHLORIDE: 0.9 INJECTION, SOLUTION INTRAVENOUS at 10:04

## 2020-12-18 RX ADMIN — PROPOFOL 50 MG: 10 INJECTION, EMULSION INTRAVENOUS at 10:16

## 2020-12-18 RX ADMIN — SODIUM CHLORIDE 125 ML/HR: 0.9 INJECTION, SOLUTION INTRAVENOUS at 08:52

## 2021-05-20 ENCOUNTER — TRANSCRIBE ORDERS (OUTPATIENT)
Dept: ADMINISTRATIVE | Facility: HOSPITAL | Age: 51
End: 2021-05-20

## 2021-05-20 NOTE — TELEPHONE ENCOUNTER
Contacted patient   Confirmed that he will need a CT preformed before he can reschedule  Dr Heydi Rice  Pt states he does use CT at [address] [place] [address]

## 2021-05-20 NOTE — TELEPHONE ENCOUNTER
Notified pt scheduled CT at [facility] on 6/2/21  Mailed appt details to pt  Will contact pt once Dr Shekhar Ortiz schedule is open for cysto procedure  Pt is aware

## 2021-05-20 NOTE — TELEPHONE ENCOUNTER
Patient managed by Ninfa Blanton office calling to report he needs some testing ordered that he did not have done due to Bruno  He was last seen 5/20  Please call back to discuss plan of care   [phone]

## 2021-05-20 NOTE — TELEPHONE ENCOUNTER
Hi,     Could you please place order for CT, I do not see an order  Then I will schedule pt for [facility] ([address] doesn't do [address]) I will let pt know once schedule  Thank you so much!

## 2021-05-24 ENCOUNTER — OFFICE VISIT (OUTPATIENT)
Dept: FAMILY MEDICINE CLINIC | Facility: CLINIC | Age: 51
End: 2021-05-24
Payer: COMMERCIAL

## 2021-05-24 VITALS
WEIGHT: 160 LBS | SYSTOLIC BLOOD PRESSURE: 134 MMHG | DIASTOLIC BLOOD PRESSURE: 94 MMHG | BODY MASS INDEX: 24.25 KG/M2 | TEMPERATURE: 96 F | HEIGHT: 68 IN

## 2021-05-24 DIAGNOSIS — Z00.00 WELL ADULT EXAM: Primary | ICD-10-CM

## 2021-05-24 DIAGNOSIS — D23.9 DYSPLASTIC NEVI: ICD-10-CM

## 2021-05-24 PROCEDURE — 1036F TOBACCO NON-USER: CPT | Performed by: FAMILY MEDICINE

## 2021-05-24 PROCEDURE — 90715 TDAP VACCINE 7 YRS/> IM: CPT

## 2021-05-24 PROCEDURE — 90471 IMMUNIZATION ADMIN: CPT

## 2021-05-24 PROCEDURE — 3725F SCREEN DEPRESSION PERFORMED: CPT | Performed by: FAMILY MEDICINE

## 2021-05-24 PROCEDURE — 99396 PREV VISIT EST AGE 40-64: CPT | Performed by: FAMILY MEDICINE

## 2021-05-24 PROCEDURE — 3008F BODY MASS INDEX DOCD: CPT | Performed by: FAMILY MEDICINE

## 2021-05-24 RX ORDER — SILDENAFIL 100 MG/1
100 TABLET, FILM COATED ORAL DAILY PRN
Qty: 10 TABLET | Refills: 5 | Status: SHIPPED | OUTPATIENT
Start: 2021-05-24 | End: 2022-03-15 | Stop reason: SDUPTHER

## 2021-05-24 NOTE — PROGRESS NOTES
Assessment/Plan: Adacel shot given at this time  Patient up-to-date with colorectal screening  Patient will follow-up with urologist appropriately  Patient see plastic surgeon for dysplastic nevi on back       Diagnoses and all orders for this visit:    Well adult exam  -     PSA, Total Screen; Future  -     CBC and differential; Future  -     Comprehensive metabolic panel; Future  -     Lipid panel; Future  -     TSH, 3rd generation with Free T4 reflex; Future  -     sildenafil (Viagra) 100 mg tablet; Take 1 tablet (100 mg total) by mouth daily as needed for erectile dysfunction    Dysplastic nevi  -     Ambulatory referral to Plastic Surgery; Future            Subjective:        Patient ID: Erum Cotter is a 45 y o  male  Patient is here for wellness exam   Patient is going for renal CT scan  Patient is seeing Urology  Labs and vaccines reviewed  No change in bowel movements  Patient with some dark urine  No fevers noted  Patient did have colonoscopy last year  No chest pain shortness of breath  Appetite is good  The following portions of the patient's history were reviewed and updated as appropriate: allergies, current medications, past family history, past medical history, past social history, past surgical history and problem list       Review of Systems   Constitutional: Negative  HENT: Negative  Eyes: Negative  Respiratory: Negative  Cardiovascular: Negative  Gastrointestinal: Negative  Endocrine: Negative  Genitourinary:        Dark urine   Musculoskeletal: Negative  Skin: Positive for color change  Allergic/Immunologic: Negative  Neurological: Negative  Hematological: Negative  Psychiatric/Behavioral: Negative  Objective:        Depression Screening and Follow-up Plan: Patient's depression screening was positive with a PHQ-2 score of 0  Clincally patient does not have depression  No treatment is required               /94 (BP Location: Right arm, Patient Position: Sitting, Cuff Size: Standard)   Temp (!) 96 °F (35 6 °C) (Tympanic)   Ht 5' 8" (1 727 m)   Wt 72 6 kg (160 lb)   BMI 24 33 kg/m²          Physical Exam  Vitals signs and nursing note reviewed  Constitutional:       General: He is not in acute distress  Appearance: Normal appearance  He is not ill-appearing, toxic-appearing or diaphoretic  HENT:      Head: Normocephalic and atraumatic  Right Ear: Tympanic membrane, ear canal and external ear normal  There is no impacted cerumen  Left Ear: Tympanic membrane, ear canal and external ear normal  There is no impacted cerumen  Nose: Nose normal  No congestion or rhinorrhea  Mouth/Throat:      Mouth: Mucous membranes are moist       Pharynx: No oropharyngeal exudate or posterior oropharyngeal erythema  Eyes:      General: No scleral icterus  Right eye: No discharge  Left eye: No discharge  Extraocular Movements: Extraocular movements intact  Conjunctiva/sclera: Conjunctivae normal       Pupils: Pupils are equal, round, and reactive to light  Neck:      Musculoskeletal: Normal range of motion and neck supple  No neck rigidity or muscular tenderness  Vascular: No carotid bruit  Cardiovascular:      Rate and Rhythm: Normal rate and regular rhythm  Pulses: Normal pulses  Heart sounds: Normal heart sounds  No murmur  No friction rub  No gallop  Pulmonary:      Effort: Pulmonary effort is normal  No respiratory distress  Breath sounds: Normal breath sounds  No stridor  No wheezing, rhonchi or rales  Chest:      Chest wall: No tenderness  Abdominal:      General: Abdomen is flat  Bowel sounds are normal  There is no distension  Palpations: Abdomen is soft  Tenderness: There is no abdominal tenderness  There is no guarding or rebound  Musculoskeletal: Normal range of motion           General: No swelling, tenderness, deformity or signs of injury  Right lower leg: No edema  Left lower leg: No edema  Lymphadenopathy:      Cervical: No cervical adenopathy  Skin:     General: Skin is warm and dry  Capillary Refill: Capillary refill takes less than 2 seconds  Coloration: Skin is not jaundiced  Findings: Lesion present  No bruising, erythema or rash  Comments: To dysplastic nevi on bilateral shoulders  Dysplastic nevi on neck anteriorly   Neurological:      Mental Status: He is alert and oriented to person, place, and time  Mental status is at baseline  Cranial Nerves: No cranial nerve deficit  Sensory: No sensory deficit  Motor: No weakness  Coordination: Coordination normal       Gait: Gait normal    Psychiatric:         Mood and Affect: Mood normal          Behavior: Behavior normal          Thought Content:  Thought content normal          Judgment: Judgment normal

## 2021-05-29 ENCOUNTER — APPOINTMENT (OUTPATIENT)
Dept: LAB | Facility: MEDICAL CENTER | Age: 51
End: 2021-05-29
Payer: COMMERCIAL

## 2021-05-29 DIAGNOSIS — Z00.00 WELL ADULT EXAM: ICD-10-CM

## 2021-05-29 LAB
ALBUMIN SERPL BCP-MCNC: 3.9 G/DL (ref 3.5–5)
ALP SERPL-CCNC: 74 U/L (ref 46–116)
ALT SERPL W P-5'-P-CCNC: 29 U/L (ref 12–78)
ANION GAP SERPL CALCULATED.3IONS-SCNC: 7 MMOL/L (ref 4–13)
AST SERPL W P-5'-P-CCNC: 19 U/L (ref 5–45)
BASOPHILS # BLD AUTO: 0.08 THOUSANDS/ΜL (ref 0–0.1)
BASOPHILS NFR BLD AUTO: 1 % (ref 0–1)
BILIRUB SERPL-MCNC: 0.96 MG/DL (ref 0.2–1)
BUN SERPL-MCNC: 15 MG/DL (ref 5–25)
CALCIUM SERPL-MCNC: 9.2 MG/DL (ref 8.3–10.1)
CHLORIDE SERPL-SCNC: 106 MMOL/L (ref 100–108)
CHOLEST SERPL-MCNC: 168 MG/DL (ref 50–200)
CO2 SERPL-SCNC: 26 MMOL/L (ref 21–32)
CREAT SERPL-MCNC: 1.07 MG/DL (ref 0.6–1.3)
EOSINOPHIL # BLD AUTO: 0.66 THOUSAND/ΜL (ref 0–0.61)
EOSINOPHIL NFR BLD AUTO: 11 % (ref 0–6)
ERYTHROCYTE [DISTWIDTH] IN BLOOD BY AUTOMATED COUNT: 12.9 % (ref 11.6–15.1)
GFR SERPL CREATININE-BSD FRML MDRD: 80 ML/MIN/1.73SQ M
GLUCOSE P FAST SERPL-MCNC: 95 MG/DL (ref 65–99)
HCT VFR BLD AUTO: 46.6 % (ref 36.5–49.3)
HDLC SERPL-MCNC: 54 MG/DL
HGB BLD-MCNC: 15.9 G/DL (ref 12–17)
IMM GRANULOCYTES # BLD AUTO: 0.01 THOUSAND/UL (ref 0–0.2)
IMM GRANULOCYTES NFR BLD AUTO: 0 % (ref 0–2)
LDLC SERPL CALC-MCNC: 98 MG/DL (ref 0–100)
LYMPHOCYTES # BLD AUTO: 1.37 THOUSANDS/ΜL (ref 0.6–4.47)
LYMPHOCYTES NFR BLD AUTO: 22 % (ref 14–44)
MCH RBC QN AUTO: 31.3 PG (ref 26.8–34.3)
MCHC RBC AUTO-ENTMCNC: 34.1 G/DL (ref 31.4–37.4)
MCV RBC AUTO: 92 FL (ref 82–98)
MONOCYTES # BLD AUTO: 0.61 THOUSAND/ΜL (ref 0.17–1.22)
MONOCYTES NFR BLD AUTO: 10 % (ref 4–12)
NEUTROPHILS # BLD AUTO: 3.54 THOUSANDS/ΜL (ref 1.85–7.62)
NEUTS SEG NFR BLD AUTO: 56 % (ref 43–75)
NONHDLC SERPL-MCNC: 114 MG/DL
NRBC BLD AUTO-RTO: 0 /100 WBCS
PLATELET # BLD AUTO: 250 THOUSANDS/UL (ref 149–390)
PMV BLD AUTO: 10.2 FL (ref 8.9–12.7)
POTASSIUM SERPL-SCNC: 3.8 MMOL/L (ref 3.5–5.3)
PROT SERPL-MCNC: 7.2 G/DL (ref 6.4–8.2)
PSA SERPL-MCNC: 0.8 NG/ML (ref 0–4)
RBC # BLD AUTO: 5.08 MILLION/UL (ref 3.88–5.62)
SODIUM SERPL-SCNC: 139 MMOL/L (ref 136–145)
TRIGL SERPL-MCNC: 81 MG/DL
TSH SERPL DL<=0.05 MIU/L-ACNC: 3.26 UIU/ML (ref 0.36–3.74)
WBC # BLD AUTO: 6.27 THOUSAND/UL (ref 4.31–10.16)

## 2021-05-29 PROCEDURE — 36415 COLL VENOUS BLD VENIPUNCTURE: CPT

## 2021-05-29 PROCEDURE — G0103 PSA SCREENING: HCPCS

## 2021-05-29 PROCEDURE — 80061 LIPID PANEL: CPT

## 2021-05-29 PROCEDURE — 85025 COMPLETE CBC W/AUTO DIFF WBC: CPT

## 2021-05-29 PROCEDURE — 84443 ASSAY THYROID STIM HORMONE: CPT

## 2021-05-29 PROCEDURE — 80053 COMPREHEN METABOLIC PANEL: CPT

## 2021-06-02 ENCOUNTER — HOSPITAL ENCOUNTER (OUTPATIENT)
Dept: CT IMAGING | Facility: HOSPITAL | Age: 51
Discharge: HOME | End: 2021-06-02
Payer: COMMERCIAL

## 2021-06-02 DIAGNOSIS — R31.0 GROSS HEMATURIA: ICD-10-CM

## 2021-06-02 PROCEDURE — 74178 CT ABD&PLV WO CNTR FLWD CNTR: CPT

## 2021-06-02 PROCEDURE — G1004 CDSM NDSC: HCPCS

## 2021-06-02 RX ADMIN — IOHEXOL 100 ML: 350 INJECTION, SOLUTION INTRAVENOUS at 17:20

## 2021-06-04 NOTE — TELEPHONE ENCOUNTER
LM for pt to confirm Cysto appt with Dr Harsha Villegas at [facility] on 6/15/21 at 3 pm

## 2021-06-15 ENCOUNTER — TELEPHONE (OUTPATIENT)
Dept: UROLOGY | Facility: CLINIC | Age: 51
End: 2021-06-15

## 2022-02-15 ENCOUNTER — OFFICE VISIT (OUTPATIENT)
Dept: FAMILY MEDICINE CLINIC | Facility: CLINIC | Age: 52
End: 2022-02-15
Payer: COMMERCIAL

## 2022-02-15 VITALS
DIASTOLIC BLOOD PRESSURE: 80 MMHG | OXYGEN SATURATION: 97 % | WEIGHT: 155 LBS | HEART RATE: 67 BPM | TEMPERATURE: 98.9 F | BODY MASS INDEX: 23.49 KG/M2 | SYSTOLIC BLOOD PRESSURE: 120 MMHG | HEIGHT: 68 IN

## 2022-02-15 DIAGNOSIS — G93.3 POSTVIRAL FATIGUE SYNDROME: ICD-10-CM

## 2022-02-15 DIAGNOSIS — R06.02 SHORTNESS OF BREATH: ICD-10-CM

## 2022-02-15 DIAGNOSIS — R20.0 LEFT UPPER EXTREMITY NUMBNESS: ICD-10-CM

## 2022-02-15 DIAGNOSIS — M19.90 ARTHRITIS: Primary | ICD-10-CM

## 2022-02-15 PROBLEM — G93.31 POSTVIRAL FATIGUE SYNDROME: Status: ACTIVE | Noted: 2022-02-15

## 2022-02-15 PROCEDURE — 99214 OFFICE O/P EST MOD 30 MIN: CPT | Performed by: FAMILY MEDICINE

## 2022-02-15 PROCEDURE — 1036F TOBACCO NON-USER: CPT | Performed by: FAMILY MEDICINE

## 2022-02-15 PROCEDURE — 3008F BODY MASS INDEX DOCD: CPT | Performed by: FAMILY MEDICINE

## 2022-02-15 NOTE — PROGRESS NOTES
Assessment/Plan:  TSH, CMP CBC lipid profile normal at this time  PSA normal  Patient go for x-rays bilateral hands, chest and neck  To consider EMG in the future if persist   Patient go for laboratory studies  Patient use NSAIDs as needed  Follow-up after studies completed  Wellness checkup at follow-up  Diagnoses and all orders for this visit:    Arthritis  -     XR hand 3+ vw right; Future  -     XR hand 3+ vw left; Future  -     CBC and differential; Future  -     Comprehensive metabolic panel; Future  -     TSH, 3rd generation with Free T4 reflex; Future  -     JOSUE Screen w/ Reflex to Titer/Pattern; Future  -     Lyme Antibody Profile with reflex to WB; Future  -     Vitamin B12; Future  -     Ferritin; Future  -     C-reactive protein; Future  -     CK (with reflex to MB); Future  -     Rheumatoid Arthritis Diagnostic Panel 1; Future  -     Sjogren's Antibodies; Future  -     SCLERODERMA DIAGNOSTIC PROFILE; Future  -     Uric acid; Future    Postviral fatigue syndrome  -     CBC and differential; Future  -     Comprehensive metabolic panel; Future  -     TSH, 3rd generation with Free T4 reflex; Future  -     JOSUE Screen w/ Reflex to Titer/Pattern; Future  -     Lyme Antibody Profile with reflex to WB; Future  -     Vitamin B12; Future  -     Ferritin; Future  -     C-reactive protein; Future  -     CK (with reflex to MB); Future  -     Rheumatoid Arthritis Diagnostic Panel 1; Future  -     Sjogren's Antibodies; Future  -     SCLERODERMA DIAGNOSTIC PROFILE; Future  -     Uric acid; Future    Left upper extremity numbness  -     XR spine cervical complete 4 or 5 vw non injury; Future  -     CBC and differential; Future  -     Comprehensive metabolic panel; Future  -     TSH, 3rd generation with Free T4 reflex; Future  -     JOSUE Screen w/ Reflex to Titer/Pattern; Future  -     Lyme Antibody Profile with reflex to WB; Future  -     Vitamin B12; Future  -     Ferritin;  Future  -     C-reactive protein; Future  -     CK (with reflex to MB); Future  -     Rheumatoid Arthritis Diagnostic Panel 1; Future  -     Sjogren's Antibodies; Future  -     SCLERODERMA DIAGNOSTIC PROFILE; Future  -     Uric acid; Future    Shortness of breath  -     XR chest pa & lateral; Future            Subjective:        Patient ID: Ronald Sanchez is a 45 y o  male  Patient is here status post COVID vaccine last Wednesday  Patient with some body aches headache and fatigue since that time frame  Patient had no symptoms prior to that vaccine  Patient using DayQuil  No fever noted  Symptoms are slowly improving  Patient with arthritic pain and bilateral hands  Patient with some numbness bilateral hands and arms in the morning  No neck pain associated with this  No chest pain shortness of breath  The patient has had numbness of bilateral upper extremities for months  Patient has used Advil  No numbness in legs  No problems urination or defecation  No other medications being used in this regard  The following portions of the patient's history were reviewed and updated as appropriate: allergies, current medications, past family history, past medical history, past social history, past surgical history and problem list       Review of Systems   Constitutional: Positive for fatigue  HENT: Negative  Eyes: Negative  Respiratory: Negative  Cardiovascular: Negative  Gastrointestinal: Negative  Endocrine: Negative  Genitourinary: Negative  Musculoskeletal: Positive for arthralgias and myalgias  Skin: Negative  Allergic/Immunologic: Negative  Neurological: Positive for numbness and headaches  Hematological: Negative  Psychiatric/Behavioral: Negative  Objective:        Depression Screening and Follow-up Plan: Clincally patient does not have depression  No treatment is required               /80   Pulse 67   Temp 98 9 °F (37 2 °C)   Ht 5' 8" (1 727 m)   Wt 70 3 kg (155 lb) SpO2 97%   BMI 23 57 kg/m²          Physical Exam  Vitals and nursing note reviewed  Constitutional:       General: He is not in acute distress  Appearance: Normal appearance  He is not ill-appearing, toxic-appearing or diaphoretic  HENT:      Head: Normocephalic and atraumatic  Right Ear: Tympanic membrane, ear canal and external ear normal  There is no impacted cerumen  Left Ear: Tympanic membrane, ear canal and external ear normal  There is no impacted cerumen  Nose: Nose normal  No congestion or rhinorrhea  Mouth/Throat:      Mouth: Mucous membranes are moist       Pharynx: No oropharyngeal exudate or posterior oropharyngeal erythema  Eyes:      General: No scleral icterus  Right eye: No discharge  Left eye: No discharge  Extraocular Movements: Extraocular movements intact  Conjunctiva/sclera: Conjunctivae normal       Pupils: Pupils are equal, round, and reactive to light  Neck:      Vascular: No carotid bruit  Cardiovascular:      Rate and Rhythm: Normal rate and regular rhythm  Pulses: Normal pulses  Heart sounds: Normal heart sounds  No murmur heard  No friction rub  No gallop  Pulmonary:      Effort: Pulmonary effort is normal  No respiratory distress  Breath sounds: Normal breath sounds  No stridor  No wheezing, rhonchi or rales  Chest:      Chest wall: No tenderness  Musculoskeletal:         General: Tenderness present  No swelling, deformity or signs of injury  Cervical back: Normal range of motion and neck supple  No rigidity  No muscular tenderness  Right lower leg: No edema  Left lower leg: No edema  Lymphadenopathy:      Cervical: No cervical adenopathy  Skin:     General: Skin is warm and dry  Capillary Refill: Capillary refill takes less than 2 seconds  Coloration: Skin is not jaundiced  Findings: No bruising, erythema, lesion or rash     Neurological:      Mental Status: He is alert and oriented to person, place, and time  Mental status is at baseline  Cranial Nerves: No cranial nerve deficit  Sensory: Sensory deficit present  Motor: No weakness  Coordination: Coordination normal       Gait: Gait normal    Psychiatric:         Mood and Affect: Mood normal          Behavior: Behavior normal          Thought Content:  Thought content normal          Judgment: Judgment normal

## 2022-02-19 ENCOUNTER — APPOINTMENT (OUTPATIENT)
Dept: RADIOLOGY | Facility: MEDICAL CENTER | Age: 52
End: 2022-02-19
Payer: COMMERCIAL

## 2022-02-19 ENCOUNTER — APPOINTMENT (OUTPATIENT)
Dept: LAB | Facility: MEDICAL CENTER | Age: 52
End: 2022-02-19
Payer: COMMERCIAL

## 2022-02-19 DIAGNOSIS — R20.0 LEFT UPPER EXTREMITY NUMBNESS: ICD-10-CM

## 2022-02-19 DIAGNOSIS — M19.90 ARTHRITIS: ICD-10-CM

## 2022-02-19 DIAGNOSIS — R06.02 SHORTNESS OF BREATH: ICD-10-CM

## 2022-02-19 DIAGNOSIS — G93.3 POSTVIRAL FATIGUE SYNDROME: ICD-10-CM

## 2022-02-19 LAB
ALBUMIN SERPL BCP-MCNC: 3.8 G/DL (ref 3.5–5)
ALP SERPL-CCNC: 67 U/L (ref 46–116)
ALT SERPL W P-5'-P-CCNC: 30 U/L (ref 12–78)
ANION GAP SERPL CALCULATED.3IONS-SCNC: 5 MMOL/L (ref 4–13)
AST SERPL W P-5'-P-CCNC: 13 U/L (ref 5–45)
BASOPHILS # BLD AUTO: 0.03 THOUSANDS/ΜL (ref 0–0.1)
BASOPHILS NFR BLD AUTO: 1 % (ref 0–1)
BILIRUB SERPL-MCNC: 0.74 MG/DL (ref 0.2–1)
BUN SERPL-MCNC: 17 MG/DL (ref 5–25)
CALCIUM SERPL-MCNC: 9.1 MG/DL (ref 8.3–10.1)
CHLORIDE SERPL-SCNC: 108 MMOL/L (ref 100–108)
CK SERPL-CCNC: 80 U/L (ref 39–308)
CO2 SERPL-SCNC: 25 MMOL/L (ref 21–32)
CREAT SERPL-MCNC: 1.07 MG/DL (ref 0.6–1.3)
CRP SERPL QL: <3 MG/L
EOSINOPHIL # BLD AUTO: 0.2 THOUSAND/ΜL (ref 0–0.61)
EOSINOPHIL NFR BLD AUTO: 5 % (ref 0–6)
ERYTHROCYTE [DISTWIDTH] IN BLOOD BY AUTOMATED COUNT: 12 % (ref 11.6–15.1)
FERRITIN SERPL-MCNC: 405 NG/ML (ref 8–388)
GFR SERPL CREATININE-BSD FRML MDRD: 79 ML/MIN/1.73SQ M
GLUCOSE P FAST SERPL-MCNC: 92 MG/DL (ref 65–99)
HCT VFR BLD AUTO: 46.7 % (ref 36.5–49.3)
HGB BLD-MCNC: 16 G/DL (ref 12–17)
IMM GRANULOCYTES # BLD AUTO: 0.01 THOUSAND/UL (ref 0–0.2)
IMM GRANULOCYTES NFR BLD AUTO: 0 % (ref 0–2)
LYMPHOCYTES # BLD AUTO: 1.03 THOUSANDS/ΜL (ref 0.6–4.47)
LYMPHOCYTES NFR BLD AUTO: 26 % (ref 14–44)
MCH RBC QN AUTO: 30.9 PG (ref 26.8–34.3)
MCHC RBC AUTO-ENTMCNC: 34.3 G/DL (ref 31.4–37.4)
MCV RBC AUTO: 90 FL (ref 82–98)
MONOCYTES # BLD AUTO: 0.54 THOUSAND/ΜL (ref 0.17–1.22)
MONOCYTES NFR BLD AUTO: 13 % (ref 4–12)
NEUTROPHILS # BLD AUTO: 2.22 THOUSANDS/ΜL (ref 1.85–7.62)
NEUTS SEG NFR BLD AUTO: 55 % (ref 43–75)
NRBC BLD AUTO-RTO: 0 /100 WBCS
PLATELET # BLD AUTO: 192 THOUSANDS/UL (ref 149–390)
PMV BLD AUTO: 9.6 FL (ref 8.9–12.7)
POTASSIUM SERPL-SCNC: 4.1 MMOL/L (ref 3.5–5.3)
PROT SERPL-MCNC: 7.9 G/DL (ref 6.4–8.2)
RBC # BLD AUTO: 5.18 MILLION/UL (ref 3.88–5.62)
SODIUM SERPL-SCNC: 138 MMOL/L (ref 136–145)
TSH SERPL DL<=0.05 MIU/L-ACNC: 1.93 UIU/ML (ref 0.36–3.74)
URATE SERPL-MCNC: 4.5 MG/DL (ref 4.2–8)
VIT B12 SERPL-MCNC: 444 PG/ML (ref 100–900)
WBC # BLD AUTO: 4.03 THOUSAND/UL (ref 4.31–10.16)

## 2022-02-19 PROCEDURE — 71046 X-RAY EXAM CHEST 2 VIEWS: CPT

## 2022-02-19 PROCEDURE — 86200 CCP ANTIBODY: CPT

## 2022-02-19 PROCEDURE — 84550 ASSAY OF BLOOD/URIC ACID: CPT

## 2022-02-19 PROCEDURE — 36415 COLL VENOUS BLD VENIPUNCTURE: CPT

## 2022-02-19 PROCEDURE — 85025 COMPLETE CBC W/AUTO DIFF WBC: CPT

## 2022-02-19 PROCEDURE — 86235 NUCLEAR ANTIGEN ANTIBODY: CPT

## 2022-02-19 PROCEDURE — 86038 ANTINUCLEAR ANTIBODIES: CPT

## 2022-02-19 PROCEDURE — 80053 COMPREHEN METABOLIC PANEL: CPT

## 2022-02-19 PROCEDURE — 72050 X-RAY EXAM NECK SPINE 4/5VWS: CPT

## 2022-02-19 PROCEDURE — 73130 X-RAY EXAM OF HAND: CPT

## 2022-02-19 PROCEDURE — 86140 C-REACTIVE PROTEIN: CPT

## 2022-02-19 PROCEDURE — 86618 LYME DISEASE ANTIBODY: CPT

## 2022-02-19 PROCEDURE — 82550 ASSAY OF CK (CPK): CPT

## 2022-02-19 PROCEDURE — 86430 RHEUMATOID FACTOR TEST QUAL: CPT

## 2022-02-19 PROCEDURE — 82728 ASSAY OF FERRITIN: CPT

## 2022-02-19 PROCEDURE — 82607 VITAMIN B-12: CPT

## 2022-02-19 PROCEDURE — 84443 ASSAY THYROID STIM HORMONE: CPT

## 2022-02-21 LAB
ENA SS-A AB SER-ACNC: <0.2 AI (ref 0–0.9)
ENA SS-B AB SER-ACNC: <0.2 AI (ref 0–0.9)
RHEUMATOID FACT SER QL LA: NEGATIVE
RYE IGE QN: NEGATIVE

## 2022-02-22 LAB
B BURGDOR IGG+IGM SER-ACNC: 19
CCP AB SER IA-ACNC: 2.2
MISCELLANEOUS LAB TEST RESULT: NORMAL

## 2022-03-15 ENCOUNTER — OFFICE VISIT (OUTPATIENT)
Dept: FAMILY MEDICINE CLINIC | Facility: CLINIC | Age: 52
End: 2022-03-15
Payer: COMMERCIAL

## 2022-03-15 VITALS
DIASTOLIC BLOOD PRESSURE: 70 MMHG | SYSTOLIC BLOOD PRESSURE: 110 MMHG | BODY MASS INDEX: 24.1 KG/M2 | HEIGHT: 68 IN | WEIGHT: 159 LBS | OXYGEN SATURATION: 97 % | HEART RATE: 65 BPM | TEMPERATURE: 98 F

## 2022-03-15 DIAGNOSIS — Z00.00 WELL ADULT EXAM: ICD-10-CM

## 2022-03-15 PROCEDURE — 99396 PREV VISIT EST AGE 40-64: CPT | Performed by: FAMILY MEDICINE

## 2022-03-15 PROCEDURE — 3008F BODY MASS INDEX DOCD: CPT | Performed by: FAMILY MEDICINE

## 2022-03-15 PROCEDURE — 1036F TOBACCO NON-USER: CPT | Performed by: FAMILY MEDICINE

## 2022-03-15 RX ORDER — SILDENAFIL 100 MG/1
100 TABLET, FILM COATED ORAL DAILY PRN
Qty: 10 TABLET | Refills: 5 | Status: SHIPPED | OUTPATIENT
Start: 2022-03-15

## 2022-03-15 NOTE — PROGRESS NOTES
Assessment/Plan:  All x-rays and laboratory studies reviewed the patient  Patient up-to-date with colonoscopy  Patient up-to-date with tetanus shot  Patient to consider Shingrix vaccine  Patient will check with insurance company for coverage  Other guidance given at this time  Patient will follow-up yearly or as needed  Patient does not wish to see Rheumatology at this time       Diagnoses and all orders for this visit:    Well adult exam  -     sildenafil (Viagra) 100 mg tablet; Take 1 tablet (100 mg total) by mouth daily as needed for erectile dysfunction            Subjective:        Patient ID: Ignacio Vanegas is a 45 y o  male  Patient is here for wellness exam   Patient doing fairly well overall  Patient with some intermittent neck pain as well as arthritic hand pain intermittently  No chest pain shortness breath or abdominal pain or problems urinating or defecating  Patient up-to-date with colonoscopy  Patient had significant mental blood work just done which was reviewed  Patient also had chest x-ray which was normal   Patient also had x-rays of bilateral hands and neck which were reviewed with patient at this time        The following portions of the patient's history were reviewed and updated as appropriate: allergies, current medications, past family history, past medical history, past social history, past surgical history and problem list       Review of Systems   Constitutional: Negative  HENT: Negative  Eyes: Negative  Respiratory: Negative  Cardiovascular: Negative  Gastrointestinal: Negative  Endocrine: Negative  Genitourinary: Negative  Musculoskeletal: Positive for arthralgias and neck pain  Skin: Negative  Allergic/Immunologic: Negative  Neurological: Negative  Hematological: Negative  Psychiatric/Behavioral: Negative  Objective:        Depression Screening and Follow-up Plan: Clincally patient does not have depression   No treatment is required  /70   Pulse 65   Temp 98 °F (36 7 °C)   Ht 5' 8" (1 727 m)   Wt 72 1 kg (159 lb)   SpO2 97%   BMI 24 18 kg/m²          Physical Exam  Vitals and nursing note reviewed  Constitutional:       General: He is not in acute distress  Appearance: Normal appearance  He is not ill-appearing, toxic-appearing or diaphoretic  HENT:      Head: Normocephalic and atraumatic  Right Ear: Tympanic membrane, ear canal and external ear normal  There is no impacted cerumen  Left Ear: Tympanic membrane, ear canal and external ear normal  There is no impacted cerumen  Nose: Nose normal  No congestion or rhinorrhea  Mouth/Throat:      Mouth: Mucous membranes are moist       Pharynx: No oropharyngeal exudate or posterior oropharyngeal erythema  Eyes:      General: No scleral icterus  Right eye: No discharge  Left eye: No discharge  Extraocular Movements: Extraocular movements intact  Conjunctiva/sclera: Conjunctivae normal       Pupils: Pupils are equal, round, and reactive to light  Neck:      Vascular: No carotid bruit  Cardiovascular:      Rate and Rhythm: Normal rate and regular rhythm  Pulses: Normal pulses  Heart sounds: Normal heart sounds  No murmur heard  No friction rub  No gallop  Pulmonary:      Effort: Pulmonary effort is normal  No respiratory distress  Breath sounds: Normal breath sounds  No stridor  No wheezing, rhonchi or rales  Chest:      Chest wall: No tenderness  Musculoskeletal:         General: Tenderness and deformity present  No swelling or signs of injury  Cervical back: Normal range of motion and neck supple  No rigidity  No muscular tenderness  Right lower leg: No edema  Left lower leg: No edema  Comments: Osteoarthritic changes to bilateral hands 1st fingers and left 4th PIP joint   Lymphadenopathy:      Cervical: No cervical adenopathy     Skin:     General: Skin is warm and dry  Capillary Refill: Capillary refill takes less than 2 seconds  Coloration: Skin is not jaundiced  Findings: No bruising, erythema, lesion or rash  Neurological:      Mental Status: He is alert and oriented to person, place, and time  Mental status is at baseline  Cranial Nerves: No cranial nerve deficit  Sensory: No sensory deficit  Motor: No weakness  Coordination: Coordination normal       Gait: Gait normal    Psychiatric:         Mood and Affect: Mood normal          Behavior: Behavior normal          Thought Content:  Thought content normal          Judgment: Judgment normal

## 2022-10-12 PROBLEM — Z00.00 WELL ADULT EXAM: Status: RESOLVED | Noted: 2021-05-24 | Resolved: 2022-10-12

## 2023-03-23 ENCOUNTER — RA CDI HCC (OUTPATIENT)
Dept: OTHER | Facility: HOSPITAL | Age: 53
End: 2023-03-23

## 2023-03-23 NOTE — PROGRESS NOTES
[facility]  [facility]       Chart reviewed, no opportunity found: CHART REVIEWED, NO OPPORTUNITY FOUND        Patients Insurance        Commercial Insurance: [address] [place] [address]

## 2023-04-06 ENCOUNTER — OFFICE VISIT (OUTPATIENT)
Dept: FAMILY MEDICINE CLINIC | Facility: CLINIC | Age: 53
End: 2023-04-06

## 2023-04-06 VITALS
SYSTOLIC BLOOD PRESSURE: 120 MMHG | HEIGHT: 69 IN | BODY MASS INDEX: 23.96 KG/M2 | WEIGHT: 161.8 LBS | HEART RATE: 61 BPM | DIASTOLIC BLOOD PRESSURE: 84 MMHG | TEMPERATURE: 98.7 F | OXYGEN SATURATION: 96 %

## 2023-04-06 DIAGNOSIS — Z12.5 PROSTATE CANCER SCREENING: ICD-10-CM

## 2023-04-06 DIAGNOSIS — M19.90 ARTHRITIS: ICD-10-CM

## 2023-04-06 DIAGNOSIS — D23.9 DYSPLASTIC NEVI: ICD-10-CM

## 2023-04-06 DIAGNOSIS — Z00.00 WELL ADULT EXAM: Primary | ICD-10-CM

## 2023-04-06 NOTE — PROGRESS NOTES
Assessment/Plan: Vaccines up-to-date  Patient will consider Shingrix in the future  Patient off further laboratory studies including PSA for wellness exam and prostate cancer screening  Colonoscopy up-to-date done in 2020  Patient will need this done in 5 years post previous colonoscopy  Patient will see dermatologist appropriately  Patient will follow-up yearly or as needed  Diagnoses and all orders for this visit:    Well adult exam  -     CBC and differential; Future  -     Comprehensive metabolic panel; Future  -     Lipid panel; Future  -     TSH, 3rd generation with Free T4 reflex; Future    Prostate cancer screening  -     PSA, Total Screen; Future    Dysplastic nevi  -     Ambulatory Referral to Dermatology; Future    Arthritis            Subjective:        Patient ID: Pao Mckeon is a 47 y o  male  Patient is here for physical exam/wellness exam   Labs and vaccines reviewed  Patient will need prostate cancer screening with PSA this year  Colonoscopy up-to-date  Patient wishes to see dermatology  The following portions of the patient's history were reviewed and updated as appropriate: allergies, current medications, past family history, past medical history, past social history, past surgical history and problem list       Review of Systems   Constitutional: Negative  HENT: Negative  Eyes: Negative  Respiratory: Negative  Cardiovascular: Negative  Gastrointestinal: Negative  Endocrine: Negative  Genitourinary: Negative  Musculoskeletal: Negative  Skin: Positive for color change  Allergic/Immunologic: Negative  Neurological: Negative  Hematological: Negative  Psychiatric/Behavioral: Negative  Objective:        Depression Screening and Follow-up Plan: Patient was screened for depression during today's encounter   They screened negative with a PHQ-2 score of 0             /84 (BP Location: Right arm, Patient Position: Sitting, "Cuff Size: Standard)   Pulse 61   Temp 98 7 °F (37 1 °C) (Temporal)   Ht 5' 9\" (1 753 m)   Wt 73 4 kg (161 lb 12 8 oz)   SpO2 96%   BMI 23 89 kg/m²          Physical Exam  Vitals and nursing note reviewed  Constitutional:       General: He is not in acute distress  Appearance: Normal appearance  He is not ill-appearing, toxic-appearing or diaphoretic  HENT:      Head: Normocephalic and atraumatic  Right Ear: Tympanic membrane, ear canal and external ear normal  There is no impacted cerumen  Left Ear: Tympanic membrane, ear canal and external ear normal  There is no impacted cerumen  Nose: Nose normal  No congestion or rhinorrhea  Mouth/Throat:      Mouth: Mucous membranes are moist       Pharynx: No oropharyngeal exudate or posterior oropharyngeal erythema  Eyes:      General: No scleral icterus  Right eye: No discharge  Left eye: No discharge  Extraocular Movements: Extraocular movements intact  Conjunctiva/sclera: Conjunctivae normal       Pupils: Pupils are equal, round, and reactive to light  Neck:      Vascular: No carotid bruit  Cardiovascular:      Rate and Rhythm: Normal rate and regular rhythm  Pulses: Normal pulses  Heart sounds: Normal heart sounds  No murmur heard  No friction rub  No gallop  Pulmonary:      Effort: Pulmonary effort is normal  No respiratory distress  Breath sounds: Normal breath sounds  No stridor  No wheezing, rhonchi or rales  Chest:      Chest wall: No tenderness  Abdominal:      General: Abdomen is flat  Bowel sounds are normal  There is no distension  Palpations: Abdomen is soft  Tenderness: There is no abdominal tenderness  There is no guarding or rebound  Musculoskeletal:         General: Tenderness present  No swelling, deformity or signs of injury  Normal range of motion  Cervical back: Normal range of motion and neck supple  No rigidity  No muscular tenderness        Right " lower leg: No edema  Left lower leg: No edema  Lymphadenopathy:      Cervical: No cervical adenopathy  Skin:     General: Skin is warm  Capillary Refill: Capillary refill takes less than 2 seconds  Coloration: Skin is not jaundiced  Findings: Lesion present  No bruising, erythema or rash  Neurological:      General: No focal deficit present  Mental Status: He is alert and oriented to person, place, and time  Cranial Nerves: No cranial nerve deficit  Sensory: No sensory deficit  Motor: No weakness  Coordination: Coordination normal       Gait: Gait normal    Psychiatric:         Mood and Affect: Mood normal          Behavior: Behavior normal          Thought Content:  Thought content normal          Judgment: Judgment normal

## 2023-05-13 DIAGNOSIS — Z00.00 WELL ADULT EXAM: ICD-10-CM

## 2023-05-15 RX ORDER — SILDENAFIL 100 MG/1
TABLET, FILM COATED ORAL
Qty: 10 TABLET | Refills: 3 | Status: SHIPPED | OUTPATIENT
Start: 2023-05-15

## 2023-06-05 PROBLEM — Z00.00 WELL ADULT EXAM: Status: RESOLVED | Noted: 2023-04-06 | Resolved: 2023-06-05

## 2023-10-05 ENCOUNTER — OFFICE VISIT (OUTPATIENT)
Dept: FAMILY MEDICINE CLINIC | Facility: CLINIC | Age: 53
End: 2023-10-05
Payer: COMMERCIAL

## 2023-10-05 VITALS
HEART RATE: 70 BPM | OXYGEN SATURATION: 98 % | SYSTOLIC BLOOD PRESSURE: 128 MMHG | DIASTOLIC BLOOD PRESSURE: 70 MMHG | TEMPERATURE: 98.1 F | HEIGHT: 69 IN | WEIGHT: 154 LBS | BODY MASS INDEX: 22.81 KG/M2

## 2023-10-05 DIAGNOSIS — R10.2 SUPRAPUBIC PAIN: ICD-10-CM

## 2023-10-05 DIAGNOSIS — R30.0 DYSURIA: Primary | ICD-10-CM

## 2023-10-05 LAB
BACTERIA UR QL AUTO: ABNORMAL /HPF
BILIRUB UR QL STRIP: NEGATIVE
CAOX CRY URNS QL MICRO: ABNORMAL /HPF
CLARITY UR: CLEAR
COLOR UR: YELLOW
GLUCOSE UR STRIP-MCNC: NEGATIVE MG/DL
HGB UR QL STRIP.AUTO: ABNORMAL
KETONES UR STRIP-MCNC: NEGATIVE MG/DL
LEUKOCYTE ESTERASE UR QL STRIP: NEGATIVE
MUCOUS THREADS UR QL AUTO: ABNORMAL
NITRITE UR QL STRIP: NEGATIVE
NON-SQ EPI CELLS URNS QL MICRO: ABNORMAL /HPF
PH UR STRIP.AUTO: 5.5 [PH]
PROT UR STRIP-MCNC: ABNORMAL MG/DL
RBC #/AREA URNS AUTO: ABNORMAL /HPF
SP GR UR STRIP.AUTO: 1.02 (ref 1–1.03)
UROBILINOGEN UR STRIP-ACNC: <2 MG/DL
WBC #/AREA URNS AUTO: ABNORMAL /HPF

## 2023-10-05 PROCEDURE — 87086 URINE CULTURE/COLONY COUNT: CPT | Performed by: FAMILY MEDICINE

## 2023-10-05 PROCEDURE — 81001 URINALYSIS AUTO W/SCOPE: CPT | Performed by: FAMILY MEDICINE

## 2023-10-05 PROCEDURE — 99214 OFFICE O/P EST MOD 30 MIN: CPT | Performed by: FAMILY MEDICINE

## 2023-10-05 RX ORDER — CIPROFLOXACIN 500 MG/1
500 TABLET, FILM COATED ORAL EVERY 12 HOURS SCHEDULED
Qty: 14 TABLET | Refills: 0 | Status: SHIPPED | OUTPATIENT
Start: 2023-10-05 | End: 2023-10-12

## 2023-10-05 NOTE — PROGRESS NOTES
Assessment/Plan: UA C&S to be done at this time. Patient go for ultrasound of the kidneys and bladder. Patient will start Cipro as directed       Diagnoses and all orders for this visit:    Dysuria  -     ciprofloxacin (CIPRO) 500 mg tablet; Take 1 tablet (500 mg total) by mouth every 12 (twelve) hours for 7 days    Suprapubic pain  -     US kidney and bladder; Future  -     ciprofloxacin (CIPRO) 500 mg tablet; Take 1 tablet (500 mg total) by mouth every 12 (twelve) hours for 7 days            Subjective:        Patient ID: Deshawn Ramos is a 47 y.o. male. Patient is here with lower abdominal pain bilaterally over the past week or so. Patient with dark urine. Possible tissue excreted. Questionable blood. No probs with defecation. No fever noted. No back pain associated with this. No fevers. No nausea. Patient has she has noticed change in urinary flow. Difficulty Urinating   This is a recurrent problem. The current episode started in the past 7 days. The problem occurs intermittently. The problem has been unchanged. The quality of the pain is described as aching. The pain is at a severity of 5/10. The following portions of the patient's history were reviewed and updated as appropriate: allergies, current medications, past family history, past medical history, past social history, past surgical history and problem list.      Review of Systems   Constitutional: Negative. HENT: Negative. Eyes: Negative. Respiratory: Negative. Cardiovascular: Negative. Gastrointestinal: Positive for abdominal pain. Endocrine: Negative. Genitourinary: Positive for difficulty urinating and dysuria. Musculoskeletal: Negative. Skin: Negative. Allergic/Immunologic: Negative. Neurological: Negative. Hematological: Negative. Psychiatric/Behavioral: Negative. Objective:        Depression Screening and Follow-up Plan: Clincally patient does not have depression.  No treatment is required. /70 (BP Location: Right arm, Patient Position: Sitting, Cuff Size: Standard)   Pulse 70   Temp 98.1 °F (36.7 °C) (Temporal)   Ht 5' 9" (1.753 m)   Wt 69.9 kg (154 lb)   SpO2 98%   BMI 22.74 kg/m²          Physical Exam  Vitals and nursing note reviewed. Constitutional:       Appearance: He is well-developed. Abdominal:      General: Bowel sounds are normal.      Tenderness: There is abdominal tenderness in the suprapubic area. There is no right CVA tenderness or left CVA tenderness. Neurological:      Mental Status: He is alert.

## 2023-10-06 LAB — BACTERIA UR CULT: NORMAL

## 2023-10-10 ENCOUNTER — HOSPITAL ENCOUNTER (OUTPATIENT)
Dept: ULTRASOUND IMAGING | Facility: HOSPITAL | Age: 53
Discharge: HOME | End: 2023-10-10
Payer: COMMERCIAL

## 2023-10-10 DIAGNOSIS — R10.2 SUPRAPUBIC PAIN: ICD-10-CM

## 2023-10-10 PROCEDURE — 76775 US EXAM ABDO BACK WALL LIM: CPT

## 2023-10-19 ENCOUNTER — TELEPHONE (OUTPATIENT)
Dept: FAMILY MEDICINE CLINIC | Facility: CLINIC | Age: 53
End: 2023-10-19

## 2023-10-19 DIAGNOSIS — N13.30 HYDRONEPHROSIS, UNSPECIFIED HYDRONEPHROSIS TYPE: Primary | ICD-10-CM

## 2023-10-20 DIAGNOSIS — N13.30 HYDRONEPHROSIS, UNSPECIFIED HYDRONEPHROSIS TYPE: Primary | ICD-10-CM

## 2023-11-02 ENCOUNTER — HOSPITAL ENCOUNTER (OUTPATIENT)
Dept: CT IMAGING | Facility: HOSPITAL | Age: 53
Discharge: HOME | End: 2023-11-02
Payer: COMMERCIAL

## 2023-11-02 DIAGNOSIS — N13.30 HYDRONEPHROSIS, UNSPECIFIED HYDRONEPHROSIS TYPE: ICD-10-CM

## 2023-11-02 PROCEDURE — 74178 CT ABD&PLV WO CNTR FLWD CNTR: CPT

## 2023-11-02 PROCEDURE — G1004 CDSM NDSC: HCPCS

## 2023-11-02 RX ADMIN — IOHEXOL 100 ML: 350 INJECTION, SOLUTION INTRAVENOUS at 19:42

## 2023-11-06 ENCOUNTER — NURSE TRIAGE (OUTPATIENT)
Age: 53
End: 2023-11-06

## 2023-11-06 NOTE — TELEPHONE ENCOUNTER
Reason for Disposition   The patient has mild-moderate hydronephrosis with obstructing kidney stone    Additional Information   Negative: The patient has severe uncontrolled pain   Negative: The patient has a fever of 101 or higher   Negative: The patient has persistent vomiting   Negative: The patient has a urological post-op issue and is an established patient   Negative: The patient has a suspected or confirmed obstructing kidney stone with moderate to severe pain   Negative: The patient has severe hydronephrosis with pain   Negative: The patient has mild /moderate pain, a history of kidney stones, and no recent imaging   Negative: The patient has moderate to severe hydronephrosis without obstructing stone and no pain   Negative: The patient has a longstanding issue, discomfort only, recent negative imaging, non-obstructing kidney stones   Negative: The patient has mild hydronephrosis and/or obstructing stone, but plan for medical expulsive therapy    Answer Questions - Initial Assessment   When did this pain start: Months ago    Where is your pain: no pain    Is your pain on the left, right, or both sides: no pain    Does your pain radiate anywhere: pain does not radiate    Can you qualify the pain: no pain    Do you have nausea or vomiting: none    Do you have a fever 101 or higher: no    Do you have any urinary symptoms: no    Do you have a history of kidney stones or UTI's: yes    Have you had recent urologic procedure or surgery: no    Have you been to ER, urgent care, PCP or another specialist for this: Specialist    Have you had recent imaging: CT scan    Have you had any urine testing within the last week: yes    Protocols used:  FLANK PAIN / KIDNEY STONES / HYDRONEPHROSIS

## 2023-11-06 NOTE — TELEPHONE ENCOUNTER
Regarding: re establish care-Hydronephrosis, unspecified hydronephrosis type  ----- Message from [facility] sent at 11/6/2023 10:20 AM EST -----  Pt called due to being referred for Hydronephrosis, unspecified hydronephrosis type, Pt was seen 2020. Pt did have urine testing, US and CT recently. [facility] stated to have pt scheduled within 2 weeks and if not sent to triage.  Pt would like to be schedule for appt at [facility]    PT call [phone]

## 2023-11-06 NOTE — TELEPHONE ENCOUNTER
Appointment made for 11/10/23, urgent NP, [facility] did not populate upon making appt. No other appts. Until December. Client added to wait list as well. Client report no pain currently, however he was having pain on 11/2/23. CT from 11/2/23: IMPRESSION:  Ureterocele protruding into the urinary bladder lumen at the left ureterovesical junction, only minimally increased in size when compared to 2016 but now containing a densely calcified 9 mm stone. Left-sided caliectasis and hydroureteronephrosis, progressed when compared to previous examination suggesting increased degree of partial obstruction at the site of ureterovesical junction. However, very slight delay of left renal nephrogram and contrast   excretion is similar or perhaps minimally progressed from remote prior examinations. No other urinary tract calculi. No solid renal mass. ED precautions reviewed.

## 2023-11-10 ENCOUNTER — OFFICE VISIT (OUTPATIENT)
Dept: UROLOGY | Facility: CLINIC | Age: 53
End: 2023-11-10
Payer: COMMERCIAL

## 2023-11-10 VITALS
OXYGEN SATURATION: 99 % | HEIGHT: 69 IN | SYSTOLIC BLOOD PRESSURE: 110 MMHG | WEIGHT: 155 LBS | BODY MASS INDEX: 22.96 KG/M2 | DIASTOLIC BLOOD PRESSURE: 78 MMHG | HEART RATE: 77 BPM

## 2023-11-10 DIAGNOSIS — N13.1 HYDRONEPHROSIS DUE TO OBSTRUCTION OF URETER: Primary | ICD-10-CM

## 2023-11-10 PROCEDURE — 99204 OFFICE O/P NEW MOD 45 MIN: CPT

## 2023-11-10 NOTE — PROGRESS NOTES
Office Visit- Urology  Jamilaha Pack 1970 MRN: [id number]      Assessment/Discussion/Plan    46 y.o. male managed by     Left side hydrouretronephrosis/ureterocele/9mm stone within ureterocele  -Patient with known ureterocele. In October patient experiencing left-sided flank/abdominal discomfort and ultrasound imaging was obtained which demonstrated mild to moderate left-sided hydronephrosis. CT renal protocol was obtained which demonstrated left-sided calyectasis, enlarged extrarenal pelvis and enlargement of the entire left ureter. Ureterocele with densely calcified 9 mm stone within it.  -Discussed case with Dr. Cyndi Titus. Plan for cystoscopy, ureteroscopy, holmium laser lithotripsy, stone extraction, left ureteral stent insertion, and possible ureterocele incision.  -Discussed risks of the procedure including bleeding, infection, injury to surrounding tissue including the urethra, bladder, ureters, kidneys, need for additional procedures, urethral/ureteral stricture, ureteral reflux, flank pain, stent related discomfort  -Informed consent signed in office today. Case request placed  -Proceed to the OR        Chief Complaint:   Lissette Mensah is a 46 y.o. 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 He was previously seen in our office for gross hematuria in 2020. CT urogram in 2016 demonstrated a left ureterocele, left hydroureter and a 7 mm left renal calculus. A repeat CT urogram was obtained which demonstrated interval enlargement of the left kidney lower pole nonobstructive calculus mildly dilated left kidney collecting system with cyst and pelvis with small ureterocele present.   Patient was advised to follow-up for cystoscopy with MD but patient was lost to follow-up. Patient was seen in the office by his PCP for lower abdominal pain with questionable gross hematuria and excretion of tissue. Ultrasound demonstrated left-sided hydronephrosis. His PCP obtained a repeat CT urogram which demonstrated left-sided calyectasis and hydroureteronephrosis with suggestion of increased degree of partial obstruction at the site of the UVJ. Finding of densely calcified 9 mm stone contained within the ureterocele. Patient denies cardiac or pulmonary history. He did not utilize anticoagulation or aspirin. No prior reactions to anesthesia      AUA SYMPTOM SCORE      Flowsheet Row Most Recent Value   AUA SYMPTOM SCORE    How often have you had a sensation of not emptying your bladder completely after you finished urinating? 1 (P)     How often have you had to urinate again less than two hours after you finished urinating? 1 (P)     How often have you found you stopped and started again several times when you urinate? 1 (P)     How often have you found it difficult to postpone urination? 1 (P)     How often have you had a weak urinary stream? 2 (P)     How often have you had to push or strain to begin urination? 1 (P)     How many times did you most typically get up to urinate from the time you went to bed at night until the time you got up in the morning? 1 (P)     Quality of Life: If you were to spend the rest of your life with your urinary condition just the way it is now, how would you feel about that? 2 (P)     AUA SYMPTOM SCORE 8 (P)             ROS:   Review of Systems   Constitutional: Negative. Negative for chills, fatigue and fever. HENT: Negative. Respiratory:  Negative for shortness of breath. Cardiovascular:  Negative for chest pain. Gastrointestinal: Negative. Negative for abdominal pain. Endocrine: Negative. Musculoskeletal: Negative. Skin: Negative. Neurological: Negative. Negative for dizziness and light-headedness. Hematological: Negative. Psychiatric/Behavioral: Negative. Past Medical History  Past Medical History:   Diagnosis Date    Anxiety     Benign neoplasm of epididymis     Two cysts in the epididymal head, found on US. Resolved 10/11/2010     Hemorrhoids     Kidney stone     Shoulder separation, right, initial encounter 2018       Past Surgical History  Past Surgical History:   Procedure Laterality Date    IA Anne Delvalle SURG REQ ANES GENERAL SPI/EDRL DX N/A 5/17/2019    Procedure: Frances ENCINAS;  Surgeon: Susan Walsh MD;  Location: [address] [place] [address] MAIN OR;  Service: Colorectal    WISDOM TOOTH EXTRACTION         Past Family History  No family history on file. Past Social history  Social History     Socioeconomic History    Marital status:      Spouse name: Not on file    Number of children: Not on file    Years of education: Not on file    Highest education level: Not on file   Occupational History    Not on file   Tobacco Use    Smoking status: Former    Smokeless tobacco: Never   Vaping Use    Vaping Use: Every day    Substances: THC   Substance and Sexual Activity    Alcohol use: Yes    Drug use: No    Sexual activity: Yes   Other Topics Concern    Not on file   Social History Narrative    Cigars (__ a day) - As per [employer]    Alcohol use (history) -  As per [employer]      Social Determinants of Health     Financial Resource Strain: Not on file   Food Insecurity: Not on file   Transportation Needs: Not on file   Physical Activity: Not on file   Stress: Not on file   Social Connections: Not on file   Intimate Partner Violence: Not on file   Housing Stability: Not on file       Current Medications  Current Outpatient Medications   Medication Sig Dispense Refill    sildenafil (VIAGRA) 100 mg tablet TAKE ONE TABLET BY MOUTH DAILY AS NEEDED FOR ERECTILE DYSFUNCTION 10 tablet 3     No current facility-administered medications for this visit. Allergies  No Known Allergies    OBJECTIVE    Vitals   There were no vitals filed for this visit. PVR:    Physical Exam  Constitutional:       General: He is not in acute distress. Appearance: Normal appearance. He is normal weight. He is not ill-appearing or toxic-appearing. HENT:      Head: Normocephalic and atraumatic. Right Ear: External ear normal.      Left Ear: External ear normal.      Nose: Nose normal.   Eyes:      Conjunctiva/sclera: Conjunctivae normal.   Cardiovascular:      Rate and Rhythm: Normal rate and regular rhythm. Pulses: Normal pulses. Heart sounds: Normal heart sounds. Pulmonary:      Effort: Pulmonary effort is normal. No respiratory distress. Abdominal:      Tenderness: There is no right CVA tenderness or left CVA tenderness. Musculoskeletal:         General: Normal range of motion. Cervical back: Normal range of motion and neck supple. Skin:     General: Skin is warm and dry. Neurological:      General: No focal deficit present. Mental Status: He is alert and oriented to person, place, and time. Cranial Nerves: No cranial nerve deficit. Psychiatric:         Mood and Affect: Mood normal.         Behavior: Behavior normal.         Thought Content: Thought content normal.          Labs:     Lab Results   Component Value Date    PSA 0.9 04/15/2023    PSA 0.8 05/29/2021     Lab Results   Component Value Date    CREATININE 1.10 04/15/2023      No results found for: "HGBA1C"  Lab Results   Component Value Date    GLUCOSE 113 03/04/2019    CALCIUM 9.6 04/15/2023    K 4.4 04/15/2023    CO2 28 04/15/2023     (H) 04/15/2023    BUN 19 04/15/2023    CREATININE 1.10 04/15/2023       I have personally reviewed all pertinent lab results and reviewed with patient    Imaging   CT ABDOMEN AND PELVIS WITH AND WITHOUT IV CONTRAST     INDICATION:   N13.30: Unspecified hydronephrosis.      COMPARISON: Multiple prior examinations including most recent renal performed October 10, 2023 and most recent CT urogram performed June 2, 2021     TECHNIQUE: Initial CT of the abdomen and pelvis was performed without intravenous contrast.  Subsequent dynamic CT evaluation of the abdomen and pelvis was performed after the administration of intravenous contrast in both nephrographic and delayed   phases. Multiplanar 2D reformatted images were created from the source data. This examination, like all CT scans performed in the [facility], was performed utilizing techniques to minimize radiation dose exposure, including the use of iterative reconstruction and automated exposure control. Radiation dose length   product (DLP) for this visit:  1411 mGy-cm     IV Contrast:  100 mL of iohexol (OMNIPAQUE)  Enteric Contrast:  Enteric contrast was not administered. FINDINGS:     ABDOMEN     RIGHT KIDNEY AND URETER:  No solid renal mass. No detectable urothelial mass. No hydronephrosis or hydroureter. No urinary tract calculi. No perinephric collection. LEFT KIDNEY AND URETER:  Left-sided caliectasis, enlarged extrarenal pelvis, and enlargement of the entire left ureter (most notably in the left hemipelvis where the left megaureter measures up to 3 cm in caliber on image 127 of series 3). Findings are all progressed when   compared to most recent prior examinations and, for example, when measured using similar technique in the similar location in June 2021 the distal left ureter measures up to 2 cm in caliber. As on prior examinations there is a small left-sided ureterocele protruding into the lumen of the urinary bladder and, while the ureterocele is minimally increased in size when compared to prior examinations at 12 x 12 mm on the current exam (compared   with 9 x 7 mm in 2016), it is now filled with a densely calcified 9 mm stone which was not present on previous examinations.      Minimal relative delay of nephrogram and contrast excretion on the left relative to the right is similar, at most minimally progressed when compared to previous examinations. Mild relative left-sided renal cortical thinning is also quite similar to prior   exams. No solid renal mass. Aside from the calculus/calcification within the ureterocele, no other left-sided urinary tract calculi are seen. URINARY BLADDER:  Ureterocele containing densely calcified stone as described above in the region of the left ureterovesical junction, approximately 9 mm. Otherwise urinary bladder is unremarkable  No urinary bladder mass        LOWER CHEST:  No clinically significant abnormality identified in the visualized lower chest.     LIVER/BILIARY TREE:  Unremarkable. GALLBLADDER:  No calcified gallstones. No pericholecystic inflammatory change. SPLEEN:  Unremarkable. PANCREAS:  Unremarkable. ADRENAL GLANDS:  Unremarkable. STOMACH AND BOWEL:  Unremarkable. APPENDIX:  A normal appendix was visualized. ABDOMINOPELVIC CAVITY:  No ascites. No free intraperitoneal air. No lymphadenopathy. VESSELS:  Unremarkable for patient's age. PELVIS     REPRODUCTIVE ORGANS:  Unremarkable for patient's age. ABDOMINAL WALL/INGUINAL REGIONS: Small left inguinal hernia containing peritoneal fatty tissue within the hernia sac. OSSEOUS STRUCTURES:  No acute fracture or destructive osseous lesion. IMPRESSION:     Ureterocele protruding into the urinary bladder lumen at the left ureterovesical junction, only minimally increased in size when compared to 2016 but now containing a densely calcified 9 mm stone. Left-sided caliectasis and hydroureteronephrosis, progressed when compared to previous examination suggesting increased degree of partial obstruction at the site of ureterovesical junction. However, very slight delay of left renal nephrogram and contrast   excretion is similar or perhaps minimally progressed from remote prior examinations.      No other urinary tract calculi. No solid renal mass. Workstation performed: [id number]       Laure Judge PA-C  Date: 11/10/2023 Time: 9:51 AM  [facility]    This note was written using fluency dictation software. Please excuse any resulting minor grammatical errors.     Plan for #5

## 2023-11-13 ENCOUNTER — TELEPHONE (OUTPATIENT)
Age: 53
End: 2023-11-13

## 2023-11-13 NOTE — TELEPHONE ENCOUNTER
Pt is seen by:  Effie/ scheduled with [facility]     Pt was last seen: 11/10/23    Pt needs to set up procedure     Pt can be reached at: [phone]

## 2023-12-08 ENCOUNTER — APPOINTMENT (OUTPATIENT)
Dept: LAB | Facility: MEDICAL CENTER | Age: 53
End: 2023-12-08
Payer: COMMERCIAL

## 2023-12-08 DIAGNOSIS — N13.1 HYDRONEPHROSIS DUE TO OBSTRUCTION OF URETER: ICD-10-CM

## 2023-12-08 LAB
ALBUMIN SERPL BCP-MCNC: 4.5 G/DL (ref 3.5–5)
ALP SERPL-CCNC: 59 U/L (ref 34–104)
ALT SERPL W P-5'-P-CCNC: 20 U/L (ref 7–52)
ANION GAP SERPL CALCULATED.3IONS-SCNC: 8 MMOL/L
AST SERPL W P-5'-P-CCNC: 20 U/L (ref 13–39)
BASOPHILS # BLD AUTO: 0.07 THOUSANDS/ÂΜL (ref 0–0.1)
BASOPHILS NFR BLD AUTO: 1 % (ref 0–1)
BILIRUB SERPL-MCNC: 0.58 MG/DL (ref 0.2–1)
BUN SERPL-MCNC: 20 MG/DL (ref 5–25)
CALCIUM SERPL-MCNC: 9.5 MG/DL (ref 8.4–10.2)
CHLORIDE SERPL-SCNC: 102 MMOL/L (ref 96–108)
CO2 SERPL-SCNC: 29 MMOL/L (ref 21–32)
CREAT SERPL-MCNC: 1.05 MG/DL (ref 0.6–1.3)
EOSINOPHIL # BLD AUTO: 0.55 THOUSAND/ÂΜL (ref 0–0.61)
EOSINOPHIL NFR BLD AUTO: 9 % (ref 0–6)
ERYTHROCYTE [DISTWIDTH] IN BLOOD BY AUTOMATED COUNT: 13 % (ref 11.6–15.1)
GFR SERPL CREATININE-BSD FRML MDRD: 80 ML/MIN/1.73SQ M
GLUCOSE P FAST SERPL-MCNC: 96 MG/DL (ref 65–99)
HCT VFR BLD AUTO: 47.9 % (ref 36.5–49.3)
HGB BLD-MCNC: 15.9 G/DL (ref 12–17)
IMM GRANULOCYTES # BLD AUTO: 0.01 THOUSAND/UL (ref 0–0.2)
IMM GRANULOCYTES NFR BLD AUTO: 0 % (ref 0–2)
LYMPHOCYTES # BLD AUTO: 1.44 THOUSANDS/ÂΜL (ref 0.6–4.47)
LYMPHOCYTES NFR BLD AUTO: 23 % (ref 14–44)
MCH RBC QN AUTO: 31.7 PG (ref 26.8–34.3)
MCHC RBC AUTO-ENTMCNC: 33.2 G/DL (ref 31.4–37.4)
MCV RBC AUTO: 96 FL (ref 82–98)
MONOCYTES # BLD AUTO: 0.8 THOUSAND/ÂΜL (ref 0.17–1.22)
MONOCYTES NFR BLD AUTO: 13 % (ref 4–12)
NEUTROPHILS # BLD AUTO: 3.53 THOUSANDS/ÂΜL (ref 1.85–7.62)
NEUTS SEG NFR BLD AUTO: 54 % (ref 43–75)
NRBC BLD AUTO-RTO: 0 /100 WBCS
PLATELET # BLD AUTO: 260 THOUSANDS/UL (ref 149–390)
PMV BLD AUTO: 10.6 FL (ref 8.9–12.7)
POTASSIUM SERPL-SCNC: 4.5 MMOL/L (ref 3.5–5.3)
PROT SERPL-MCNC: 7.2 G/DL (ref 6.4–8.4)
RBC # BLD AUTO: 5.01 MILLION/UL (ref 3.88–5.62)
SODIUM SERPL-SCNC: 139 MMOL/L (ref 135–147)
WBC # BLD AUTO: 6.4 THOUSAND/UL (ref 4.31–10.16)

## 2023-12-08 PROCEDURE — 80053 COMPREHEN METABOLIC PANEL: CPT

## 2023-12-08 PROCEDURE — 36415 COLL VENOUS BLD VENIPUNCTURE: CPT

## 2023-12-08 PROCEDURE — 85025 COMPLETE CBC W/AUTO DIFF WBC: CPT

## 2023-12-08 PROCEDURE — 87086 URINE CULTURE/COLONY COUNT: CPT

## 2023-12-09 LAB — BACTERIA UR CULT: NORMAL

## 2023-12-15 NOTE — PRE-PROCEDURE INSTRUCTIONS
Pre-Surgery Instructions:   Medication Instructions    sildenafil (VIAGRA) 100 mg tablet Uses PRN- DO NOT take day of surgery   Medication instructions for day surgery reviewed. Please use only a sip of water to take your instructed medications. Avoid all over the counter vitamins, supplements and NSAIDS for one week prior to surgery per anesthesia guidelines. Tylenol is ok to take as needed.     You will receive a call one business day prior to surgery with an arrival time and hospital directions. If your surgery is scheduled on a Monday, the hospital will be calling you on the Friday prior to your surgery. If you have not heard from anyone by 8pm, please call the hospital supervisor through the hospital  at [phone]. (Willis [phone]).    Do not eat or drink anything after midnight the night before your surgery, including candy, mints, lifesavers, or chewing gum. Do not drink alcohol 24hrs before your surgery. Try not to smoke at least 24hrs before your surgery.       Follow the pre surgery showering instructions as listed in the “My Surgical Experience Booklet” or otherwise provided by your surgeon's office. Do not use a blade to shave the surgical area 1 week before surgery. It is okay to use a clean electric clippers up to 24 hours before surgery. Do not apply any lotions, creams, including makeup, cologne, deodorant, or perfumes after showering on the day of your surgery. Do not use dry shampoo, hair spray, hair gel, or any type of hair products.     No contact lenses, eye make-up, or artificial eyelashes. Remove nail polish, including gel polish, and any artificial, gel, or acrylic nails if possible. Remove all jewelry including rings and body piercing jewelry.     Wear causal clothing that is easy to take on and off. Consider your type of surgery.    Keep any valuables, jewelry, piercings at home. Please bring any specially ordered equipment (sling, braces) if indicated.    Arrange for a  responsible person to drive you to and from the hospital on the day of your surgery. Visitor Guidelines discussed.     Call the surgeon's office with any new illnesses, exposures, or additional questions prior to surgery.    Please reference your “My Surgical Experience Booklet” for additional information to prepare for your upcoming surgery.

## 2023-12-21 ENCOUNTER — ANESTHESIA EVENT (OUTPATIENT)
Dept: PERIOP | Facility: HOSPITAL | Age: 53
End: 2023-12-21
Payer: COMMERCIAL

## 2023-12-22 ENCOUNTER — ANESTHESIA (OUTPATIENT)
Dept: PERIOP | Facility: HOSPITAL | Age: 53
End: 2023-12-22
Payer: COMMERCIAL

## 2023-12-22 ENCOUNTER — TELEPHONE (OUTPATIENT)
Dept: UROLOGY | Facility: MEDICAL CENTER | Age: 53
End: 2023-12-22

## 2023-12-22 ENCOUNTER — APPOINTMENT (OUTPATIENT)
Dept: RADIOLOGY | Facility: HOSPITAL | Age: 53
End: 2023-12-22
Payer: COMMERCIAL

## 2023-12-22 ENCOUNTER — TELEPHONE (OUTPATIENT)
Dept: OTHER | Facility: HOSPITAL | Age: 53
End: 2023-12-22

## 2023-12-22 ENCOUNTER — HOSPITAL ENCOUNTER (OUTPATIENT)
Facility: HOSPITAL | Age: 53
Setting detail: OUTPATIENT SURGERY
Discharge: HOME | End: 2023-12-22
Attending: STUDENT IN AN ORGANIZED HEALTH CARE EDUCATION/TRAINING PROGRAM | Admitting: STUDENT IN AN ORGANIZED HEALTH CARE EDUCATION/TRAINING PROGRAM
Payer: COMMERCIAL

## 2023-12-22 VITALS
TEMPERATURE: 97.3 F | SYSTOLIC BLOOD PRESSURE: 153 MMHG | BODY MASS INDEX: 23.15 KG/M2 | DIASTOLIC BLOOD PRESSURE: 97 MMHG | OXYGEN SATURATION: 99 % | WEIGHT: 156.75 LBS | RESPIRATION RATE: 16 BRPM | HEART RATE: 76 BPM

## 2023-12-22 DIAGNOSIS — N20.1 LEFT URETERAL STONE: Primary | ICD-10-CM

## 2023-12-22 DIAGNOSIS — N13.1 HYDRONEPHROSIS DUE TO OBSTRUCTION OF URETER: ICD-10-CM

## 2023-12-22 PROCEDURE — 52356 CYSTO/URETERO W/LITHOTRIPSY: CPT | Performed by: STUDENT IN AN ORGANIZED HEALTH CARE EDUCATION/TRAINING PROGRAM

## 2023-12-22 PROCEDURE — 74420 UROGRAPHY RTRGR +-KUB: CPT

## 2023-12-22 PROCEDURE — NC001 PR NO CHARGE: Performed by: STUDENT IN AN ORGANIZED HEALTH CARE EDUCATION/TRAINING PROGRAM

## 2023-12-22 PROCEDURE — C1758 CATHETER, URETERAL: HCPCS | Performed by: STUDENT IN AN ORGANIZED HEALTH CARE EDUCATION/TRAINING PROGRAM

## 2023-12-22 PROCEDURE — C1769 GUIDE WIRE: HCPCS | Performed by: STUDENT IN AN ORGANIZED HEALTH CARE EDUCATION/TRAINING PROGRAM

## 2023-12-22 PROCEDURE — C2617 STENT, NON-COR, TEM W/O DEL: HCPCS | Performed by: STUDENT IN AN ORGANIZED HEALTH CARE EDUCATION/TRAINING PROGRAM

## 2023-12-22 PROCEDURE — 82360 CALCULUS ASSAY QUANT: CPT | Performed by: STUDENT IN AN ORGANIZED HEALTH CARE EDUCATION/TRAINING PROGRAM

## 2023-12-22 DEVICE — INLAY OPTIMA URETERAL STENT W/O GUIDEWIRE
Type: IMPLANTABLE DEVICE | Site: URETER | Status: FUNCTIONAL
Brand: BARD® INLAY OPTIMA® URETERAL STENT

## 2023-12-22 RX ORDER — MIDAZOLAM HYDROCHLORIDE 2 MG/2ML
INJECTION, SOLUTION INTRAMUSCULAR; INTRAVENOUS AS NEEDED
Status: DISCONTINUED | OUTPATIENT
Start: 2023-12-22 | End: 2023-12-22

## 2023-12-22 RX ORDER — MAGNESIUM HYDROXIDE 1200 MG/15ML
LIQUID ORAL AS NEEDED
Status: DISCONTINUED | OUTPATIENT
Start: 2023-12-22 | End: 2023-12-22 | Stop reason: HOSPADM

## 2023-12-22 RX ORDER — ONDANSETRON 2 MG/ML
4 INJECTION INTRAMUSCULAR; INTRAVENOUS ONCE AS NEEDED
Status: DISCONTINUED | OUTPATIENT
Start: 2023-12-22 | End: 2023-12-22 | Stop reason: HOSPADM

## 2023-12-22 RX ORDER — LIDOCAINE HYDROCHLORIDE 20 MG/ML
INJECTION, SOLUTION EPIDURAL; INFILTRATION; INTRACAUDAL; PERINEURAL AS NEEDED
Status: DISCONTINUED | OUTPATIENT
Start: 2023-12-22 | End: 2023-12-22

## 2023-12-22 RX ORDER — CEFUROXIME AXETIL 250 MG/1
250 TABLET ORAL EVERY 12 HOURS SCHEDULED
Qty: 6 TABLET | Refills: 0 | Status: SHIPPED | OUTPATIENT
Start: 2023-12-24 | End: 2023-12-27

## 2023-12-22 RX ORDER — CEFAZOLIN SODIUM 1 G/50ML
1000 SOLUTION INTRAVENOUS ONCE
Status: COMPLETED | OUTPATIENT
Start: 2023-12-22 | End: 2023-12-22

## 2023-12-22 RX ORDER — HYDROMORPHONE HCL/PF 1 MG/ML
0.5 SYRINGE (ML) INJECTION
Status: DISCONTINUED | OUTPATIENT
Start: 2023-12-22 | End: 2023-12-22 | Stop reason: HOSPADM

## 2023-12-22 RX ORDER — TAMSULOSIN HYDROCHLORIDE 0.4 MG/1
0.4 CAPSULE ORAL
Qty: 30 CAPSULE | Refills: 0 | Status: SHIPPED | OUTPATIENT
Start: 2023-12-22 | End: 2024-01-21

## 2023-12-22 RX ORDER — FENTANYL CITRATE/PF 50 MCG/ML
50 SYRINGE (ML) INJECTION
Status: DISCONTINUED | OUTPATIENT
Start: 2023-12-22 | End: 2023-12-22 | Stop reason: HOSPADM

## 2023-12-22 RX ORDER — KETOROLAC TROMETHAMINE 30 MG/ML
INJECTION, SOLUTION INTRAMUSCULAR; INTRAVENOUS AS NEEDED
Status: DISCONTINUED | OUTPATIENT
Start: 2023-12-22 | End: 2023-12-22

## 2023-12-22 RX ORDER — SODIUM CHLORIDE 9 MG/ML
125 INJECTION, SOLUTION INTRAVENOUS CONTINUOUS
Status: DISCONTINUED | OUTPATIENT
Start: 2023-12-22 | End: 2023-12-22 | Stop reason: HOSPADM

## 2023-12-22 RX ORDER — ONDANSETRON 2 MG/ML
INJECTION INTRAMUSCULAR; INTRAVENOUS AS NEEDED
Status: DISCONTINUED | OUTPATIENT
Start: 2023-12-22 | End: 2023-12-22

## 2023-12-22 RX ORDER — PHENAZOPYRIDINE HYDROCHLORIDE 100 MG/1
100 TABLET, FILM COATED ORAL 3 TIMES DAILY PRN
Qty: 10 TABLET | Refills: 0 | Status: SHIPPED | OUTPATIENT
Start: 2023-12-22

## 2023-12-22 RX ORDER — OXYBUTYNIN CHLORIDE 5 MG/1
5 TABLET ORAL 3 TIMES DAILY PRN
Qty: 30 TABLET | Refills: 0 | Status: SHIPPED | OUTPATIENT
Start: 2023-12-22 | End: 2024-01-06

## 2023-12-22 RX ORDER — PROPOFOL 10 MG/ML
INJECTION, EMULSION INTRAVENOUS AS NEEDED
Status: DISCONTINUED | OUTPATIENT
Start: 2023-12-22 | End: 2023-12-22

## 2023-12-22 RX ORDER — FENTANYL CITRATE 50 UG/ML
INJECTION, SOLUTION INTRAMUSCULAR; INTRAVENOUS AS NEEDED
Status: DISCONTINUED | OUTPATIENT
Start: 2023-12-22 | End: 2023-12-22

## 2023-12-22 RX ADMIN — FENTANYL CITRATE 50 MCG: 50 INJECTION, SOLUTION INTRAMUSCULAR; INTRAVENOUS at 12:46

## 2023-12-22 RX ADMIN — LIDOCAINE HYDROCHLORIDE 100 MG: 20 INJECTION, SOLUTION EPIDURAL; INFILTRATION; INTRACAUDAL at 11:03

## 2023-12-22 RX ADMIN — PROPOFOL 200 MG: 10 INJECTION, EMULSION INTRAVENOUS at 11:03

## 2023-12-22 RX ADMIN — KETOROLAC TROMETHAMINE 30 MG: 30 INJECTION, SOLUTION INTRAMUSCULAR at 12:19

## 2023-12-22 RX ADMIN — MIDAZOLAM 2 MG: 1 INJECTION INTRAMUSCULAR; INTRAVENOUS at 10:52

## 2023-12-22 RX ADMIN — ONDANSETRON 4 MG: 2 INJECTION INTRAMUSCULAR; INTRAVENOUS at 11:12

## 2023-12-22 RX ADMIN — FENTANYL CITRATE 50 MCG: 50 INJECTION INTRAMUSCULAR; INTRAVENOUS at 10:57

## 2023-12-22 RX ADMIN — SODIUM CHLORIDE 125 ML/HR: 0.9 INJECTION, SOLUTION INTRAVENOUS at 08:20

## 2023-12-22 RX ADMIN — FENTANYL CITRATE 50 MCG: 50 INJECTION INTRAMUSCULAR; INTRAVENOUS at 11:54

## 2023-12-22 RX ADMIN — CEFAZOLIN SODIUM 1000 MG: 1 SOLUTION INTRAVENOUS at 10:52

## 2023-12-22 NOTE — OP NOTE
OPERATIVE REPORT     Name: Toby Izquierdo     MRN: [id number]  Date: 12/22/2023 Time: 12:35 PM     Location:       [facility] OR   Date of Operation:  12/22/2023   Service: Urology     Pre-op Diagnosis:  Left ureteral calculus    Post-op Diagnosis:  Same     Operation:     Cystoscopy   Left ureteroscopy laser lithotripsy basket stone extraction  Left Retrograde Pyelogram   Left ureteral stent placement (6Fr x 26 cm JJ stent)  Fluoroscopic Guidance    Surgeon:  Kylah Dee MD  Assistants:  None      Anesthesia:  LMA   Drains: Left 6Fr x 26 cm Bard InLay Optima ureteral stent  Estimated Blood Loss:  Minimal   Urine Output:  N/A   Specimens: Left ureteral stone for analysis  Apparent Intraoperative Complications:  None   Patient Condition:  Stable   Disposition:  PACU  Antibiotic Prophylaxis:  Ancef     Indications:     Left ureteral stone    Findings:    Large ureterocele on the left able to be cannulated with a 0.035mm Solo plus wire.  Large ureterovesical junction stone within the ureterocele was identified, fragmented, and extracted.  Successful/routine left ureteral stent placement which was left on a string.    Operative Report:    The patient was identified, and the procedure verified. After induction of anesthesia the patient was prepped and draped in the dorsolithotomy position.? An operative timeout was performed to confirm the patient identity, procedure, and laterality.  A  film was obtained. A 22.5 Fr rigid cystoscope was passed per urethra to the bladder revealing the above findings.  Initial attempts at intubating the orifice to the ureter through the ureterocele were unsuccessful.  With the assistance of a 5 Afghan open-ended catheter, 70 degree lens, and Rupa's bridge I was able to successfully cannulate the orifice and passed the wire up to the renal pelvis under fluoroscopic guidance.  A second working wire was placed using a dual-lumen to facilitate passage of the semirigid  ureteroscope into the distal ureter.  Initial inspection of the ureter did not reveal the stone however reinspection revealed the stone at the ureterovesical junction immediately inside the orifice.  A 272 micron holmium laser fiber was used to fragment the stone using fragmentation technique.  A Knack.it basket was used to remove all fragments. The dual-lumen was passed over the remaining wire and a retrograde pyelogram was performed.  The dual lumen was removed keeping the wire in place. ?A 6Fr ?x 26cm?JJ stent was placed in standard retrograde fashion under fluoroscopic guidance. ?Upon removal of the wire an adequate curl was noted in the renal pelvis and the bladder on fluoroscopy. The bladder was emptied. The patient was awoken from anesthesia?and transferred to PACU in satisfactory condition.?     I was present for the entire procedure.     Plan:    Discharge to home  Flomax daily x7 days  Ditropan/Pyridium as needed for stent related discomfort  Antibiotics x3 days starting the day prior to stent removal  Remove stent at home on 12/25/2023  Return to clinic in 2 months with a renal scan prior    SIGNATURE: Kylah Dee MD  DATE: 12/22/2023  TIME: 12:35 PM

## 2023-12-22 NOTE — DISCHARGE INSTR - AVS FIRST PAGE
Mr Izquierdo,    You underwent treatment of a left distal ureteral stone.  I was able to remove the stone in its entirety.  You have a temporary ureteral stent that has a string attached to it.  The string is secured to your penis.  You may remove the stent in the morning on David Day.  Please start the prescribed antibiotic the day before for 3 days.  You may experience some initial discomfort due to ureteral spasm that should resolve within 24 hours.  Continuing to take Flomax starting today for about a week can be helpful to minimize this discomfort.  I we will have the office reach out to arrange a follow-up visit in about 2 months with a renal scan prior to evaluate the function of the kidney and for obstruction.  Happy holidays!    Best,  Dr. Dee      Instructions for Ureteral Stent      A stent was placed in your ureter (the tube from your kidney to your bladder)    Some discomfort is normal. Certain movements may trigger pain or a feeling that you need to urinate. You may also feel soreness or pressure before or during urination. These symptoms will go away a few days after the stent has been removed.   Your urine may be slightly pink or red. This is due to bleeding caused by minor irritation from the stent. This may happen on and off while you have the stent in place.   It is important to drink water (at least 1 liter or quart each day) to dilute your urine.    AFTER the stent is removed you may have ureteral spasms.  This may feel very similar to pain caused by a kidney stone.  This pain will pass with time as the swelling in your ureter subsides. Warm showers can help with ureteral spasm pain.    The ureteral stent is temporary and should not remain in your body. A stent that stays in for too long can be dangerous and possibly require major surgery to extract from your body. It is extremely important that you keep your follow up appointments with the urology department. Please call our office at  [phone] if you have any questions.         Control of Pain and Stent Discomfort:   Ibuprofen (Motrin) and acetaminophen (Tylenol) according to manufacturers instructions.      Take tamsulosin (Flomax) 0.4 mg daily to help with stent discomfort.       Take Ditropan (oxybutynin) daily as needed for urinary frequency/urgency while stent is in place.     Take Pyridium (phenazopyridine) as needed for burning with urination

## 2023-12-22 NOTE — TELEPHONE ENCOUNTER
Post Op Note    Toby Izquierdo is a 53 y.o. male s/p CYSTO USCOPE W/  LASER,&  STENT (Left: Bladder)  on 12/22/23 with Dr. Dee.    How would you rate your pain on a scale from 1 to 10, 10 being the worst pain ever? 2  Have you had a fever? No    Have your bowel movements been regular? Pt advised to start stool softener with good hydration  Do you have any difficulty urinating? No  If the patient has an incision- do you have any redness around the incision or any drainage from the incision? No incision   If the patient has a drain- are you having any issues with the drain? No drain   If the patient has a todd- are you comfortable caring for your todd? No todd   Do you have any other questions or concerns that I can address at this time?     Pt states he is doing well.  He is experiencing some discomfort with urination.  He was advised to take all meds prescribed to help alleviate this.  He is aware he is to remove his own stent at home on Dec 25 and feels comfortable doing so.  He is scheduled for 2 month fu with Dr. Dee on 3/13/24 with [facility] W FLOW AND FUNCTION W RX prior.  Number provided for Central Scheduling.

## 2023-12-22 NOTE — H&P
"  UROLOGY HISTORY AND PHYSICAL  Name: Toby Izquierdo  : 1970  MRN: [id number]  Date of Service: 23    Surgical indication:  Left flank pain, left ureterocele, left distal ureteral calculus    History of present illness:  Toby Izquierdo is a 53 y.o. male presenting for cystoscopy, left ureteroscopy, laser lithotripsy, stone extraction, stent placement with possible incision of left ureterocele.  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  He was previously seen in our office for gross hematuria in .  CT urogram in  demonstrated a left ureterocele, left hydroureter and a 7 mm left renal calculus.  A repeat CT urogram was obtained which demonstrated interval enlargement of the left kidney lower pole nonobstructive calculus mildly dilated left kidney collecting system with cyst and pelvis with small ureterocele present.  Patient was advised to follow-up for cystoscopy with MD but patient was lost to follow-up.Patient was seen in the office by his PCP for lower abdominal pain with questionable gross hematuria and excretion of tissue.  Ultrasound demonstrated left-sided hydronephrosis.  His PCP obtained a repeat CT urogram which demonstrated left-sided calyectasis and hydroureteronephrosis with suggestion of increased degree of partial obstruction at the site of the UVJ.  Finding of densely calcified 9 mm stone contained within the ureterocele.  Patient denies cardiac or pulmonary history.  He did not utilize anticoagulation or aspirin.  No prior reactions to anesthesia .\"    He has had no interval changes in this history.  He denies any urinary symptoms today.  I personally reviewed his recent CT scan which demonstrates a " ureterovesical junction stone on the left within the ureterocele with moderate to severe left hydroureteronephrosis.    Past medical history:  Past Medical History:   Diagnosis Date    Anxiety     Benign neoplasm of epididymis     Two cysts in the epididymal head, found on US. Resolved 10/11/2010     Hemorrhoids     Kidney stone     Shoulder separation, right, initial encounter 2018       Past surgical history  Past Surgical History:   Procedure Laterality Date    COLONOSCOPY      [place] ANRCT XM SURG REQ ANES GENERAL SPI/EDRL DX N/A 05/17/2019    Procedure: EUA, PLACEMENT OF SETON;  Surgeon: Britton Stephens MD;  Location:  MAIN OR;  Service: Colorectal    WISDOM TOOTH EXTRACTION         Current medications:  Current Facility-Administered Medications   Medication Dose Route Frequency Provider Last Rate Last Admin    ceFAZolin (ANCEF) IVPB (premix in dextrose) 1,000 mg 50 mL  1,000 mg Intravenous Once Alexander Chou PA-C        sodium chloride 0.9 % infusion  125 mL/hr Intravenous Continuous Delphy Defalcis,  mL/hr at 12/22/23 0820 125 mL/hr at 12/22/23 0820       Allergies:  No Known Allergies    Social history:  Social History     Socioeconomic History    Marital status:      Spouse name: None    Number of children: None    Years of education: None    Highest education level: None   Occupational History    None   Tobacco Use    Smoking status: Former     Types: Cigarettes, Cigars    Smokeless tobacco: Never    Tobacco comments:     Pt quit 2020   Vaping Use    Vaping status: Every Day    Substances: THC   Substance and Sexual Activity    Alcohol use: Yes     Comment: weekly    Drug use: No    Sexual activity: Yes   Other Topics Concern    None   Social History Narrative    Cigars (__ a day) - As per [employer]    Alcohol use (history) -  As per [employer]      Social Determinants of Health     Financial Resource Strain: Not on file   Food Insecurity: Not on file   Transportation Needs: Not on file  "  Physical Activity: Not on file   Stress: Not on file   Social Connections: Not on file   Intimate Partner Violence: Not on file   Housing Stability: Not on file       Family history:  History reviewed. No pertinent family history.    Review of systems:  Pertinent positives and negatives in HPI    Physical exam:  /85   Pulse 68   Temp 97.8 °F (36.6 °C) (Temporal)   Resp 16   Wt 71.1 kg (156 lb 12 oz)   SpO2 98%   BMI 23.15 kg/m²   General:  Healthy appearing male in no acute distress.   Head:  Normocephalic, atraumatic.   Eyes: no scleral icterus  ENMT: Nares patent, moist mucous membranes  Cardiovascular:  Regular rate  Respiratory:  Patient has unlabored respirations.   Abdomen:  Abdomen nondistended  Extremities: Normal ROM, no deformities    Labs:  CBC:   Lab Results   Component Value Date    WBC 6.40 12/08/2023    HGB 15.9 12/08/2023    HCT 47.9 12/08/2023    MCV 96 12/08/2023     12/08/2023       BMP:   Lab Results   Component Value Date    GLUCOSE 113 03/04/2019    CALCIUM 9.5 12/08/2023    K 4.5 12/08/2023    CO2 29 12/08/2023     12/08/2023    BUN 20 12/08/2023    CREATININE 1.05 12/08/2023       Imaging:  Reviewed    Assessment:  Left ureterocele  Left UVJ stone  Left hydroureteronephrosis    Plan:  OR for cystoscopy, left ureteroscopy, laser lithotripsy, stone extraction, stent placement with possible incision of left ureterocele.  Patient marked  Consent previously obtained and scanned into chart    Kylah Dee MD  [facility]    Portions of the above record have been created with voice recognition software. Occasional wrong word or \"sound alike\" substitution may have occurred due to the inherent limitations of voice recognition software.  Please read the chart carefully and recognize, using context, where substitution may have occurred.  For further clarification, please contact me directly.        "

## 2023-12-22 NOTE — ANESTHESIA POSTPROCEDURE EVALUATION
Post-Op Assessment Note    CV Status:  Stable    Pain management: adequate       Mental Status:  Alert and awake   Hydration Status:  Euvolemic   PONV Controlled:  Controlled   Airway Patency:  Patent  Two or more mitigation strategies used for obstructive sleep apnea   Post Op Vitals Reviewed: Yes      Staff: Anesthesiologist               BP      Temp      Pulse     Resp      SpO2      /97   Pulse 76   Temp (!) 97.3 °F (36.3 °C) (Temporal)   Resp 16   Wt 71.1 kg (156 lb 12 oz)   SpO2 99%   BMI 23.15 kg/m²

## 2023-12-22 NOTE — ANESTHESIA PREPROCEDURE EVALUATION
Procedure:  CYSTO USCOPE W/  LASER,&  STENT  W/ PSB URETEROCELE INCISION (Left: Bladder)    Relevant Problems   MUSCULOSKELETAL   (+) Arthritis      PULMONARY   (+) Shortness of breath        Physical Exam    Airway    Mallampati score: II  TM Distance: >3 FB  Neck ROM: full     Dental   No notable dental hx     Cardiovascular  Rhythm: regular, Rate: normal, Cardiovascular exam normal    Pulmonary  Pulmonary exam normal Breath sounds clear to auscultation    Other Findings      Anesthesia Plan  ASA Score- 2     Anesthesia Type- general with ASA Monitors.         Additional Monitors:     Airway Plan:            Plan Factors-        Patient summary reviewed.    Patient is not a current smoker. Patient not instructed to abstain from smoking on day of procedure. Patient did not smoke on day of surgery.    There is medical exclusion for perioperative obstructive sleep apnea risk education.        Induction- intravenous.    Postoperative Plan-     Informed Consent- Anesthetic plan and risks discussed with patient and spouse.

## 2023-12-26 NOTE — TELEPHONE ENCOUNTER
Spoke with patient. He was able to successfully remove the stent on the string yesterday - patient is feeling fine. He has a follow up appt in March with Dr. Dee already scheduled and he is aware he needs the renal scan prior to this appt.

## 2023-12-26 NOTE — TELEPHONE ENCOUNTER
Stent was removed by patient.  Please see other encounter.  Patient is scheduled for follow up in March and will have renal scan prior to appointment.

## 2023-12-31 LAB
CALCIUM OXALATE DIHYDRATE MFR STONE IR: 20 %
COLOR STONE: NORMAL
COM MFR STONE: 70 %
COMMENT-STONE3: NORMAL
COMPOSITION: NORMAL
HYDROXYAPATITE 24H ENGDIFF UR: 10 %
LABORATORY COMMENT REPORT: NORMAL
PHOTO: NORMAL
SIZE STONE: NORMAL MM
SPEC SOURCE SUBJ: NORMAL
STONE ANALYSIS-IMP: NORMAL
WT STONE: 419 MG

## 2024-01-03 ENCOUNTER — OFFICE VISIT (OUTPATIENT)
Dept: FAMILY MEDICINE CLINIC | Facility: CLINIC | Age: 54
End: 2024-01-03
Payer: COMMERCIAL

## 2024-01-03 VITALS
TEMPERATURE: 99 F | OXYGEN SATURATION: 97 % | HEART RATE: 71 BPM | WEIGHT: 162.4 LBS | SYSTOLIC BLOOD PRESSURE: 120 MMHG | DIASTOLIC BLOOD PRESSURE: 68 MMHG | BODY MASS INDEX: 23.98 KG/M2

## 2024-01-03 DIAGNOSIS — M54.12 CERVICAL RADICULOPATHY: Primary | ICD-10-CM

## 2024-01-03 DIAGNOSIS — S29.019A THORACIC MYOFASCIAL STRAIN, INITIAL ENCOUNTER: ICD-10-CM

## 2024-01-03 PROCEDURE — 99213 OFFICE O/P EST LOW 20 MIN: CPT | Performed by: FAMILY MEDICINE

## 2024-01-03 RX ORDER — PREDNISONE 10 MG/1
TABLET ORAL
Qty: 45 TABLET | Refills: 0 | Status: SHIPPED | OUTPATIENT
Start: 2024-01-03

## 2024-01-03 RX ORDER — METHOCARBAMOL 500 MG/1
500 TABLET, FILM COATED ORAL 4 TIMES DAILY
Qty: 60 TABLET | Refills: 0 | Status: SHIPPED | OUTPATIENT
Start: 2024-01-03

## 2024-01-03 NOTE — PROGRESS NOTES
Assessment/Plan:       Diagnoses and all orders for this visit:    Cervical radiculopathy  -     methocarbamol (ROBAXIN) 500 mg tablet; Take 1 tablet (500 mg total) by mouth 4 (four) times a day  -     predniSONE 10 mg tablet; 5 pills daily for 3 days, 4 for 3 days, 3 for 3 days, 2 for 3 days, 1 for 3 days.    Thoracic myofascial strain, initial encounter  -     methocarbamol (ROBAXIN) 500 mg tablet; Take 1 tablet (500 mg total) by mouth 4 (four) times a day  -     predniSONE 10 mg tablet; 5 pills daily for 3 days, 4 for 3 days, 3 for 3 days, 2 for 3 days, 1 for 3 days.            Subjective:        Patient ID: Toby Izquierdo is a 53 y.o. male.      Patient is here with right shoulder/cervical discomfort with radiation down his right arm which began the day after David.  Patient removed stent and then pain ensued.  No chest pain shortness of breath associated with this.  No fevers or rash noted.  Patient did use Advil.    Shoulder Pain           The following portions of the patient's history were reviewed and updated as appropriate: allergies, current medications, past family history, past medical history, past social history, past surgical history and problem list.      Review of Systems   Constitutional: Negative.    HENT: Negative.     Eyes: Negative.    Respiratory: Negative.     Cardiovascular: Negative.    Gastrointestinal: Negative.    Endocrine: Negative.    Genitourinary: Negative.    Musculoskeletal:  Positive for arthralgias and neck pain.   Skin: Negative.    Allergic/Immunologic: Negative.    Neurological: Negative.    Hematological: Negative.    Psychiatric/Behavioral: Negative.             Objective:        Tobacco Cessation Counseling: Tobacco cessation counseling was provided. The patient is sincerely urged to quit consumption of tobacco. He is not ready to quit tobacco.             /68 (BP Location: Left arm, Patient Position: Sitting, Cuff Size: Standard)   Pulse 71   Temp 99 °F  (37.2 °C) (Temporal)   Wt 73.7 kg (162 lb 6.4 oz)   SpO2 97%   BMI 23.98 kg/m²          Physical Exam  Vitals and nursing note reviewed.   Constitutional:       General: He is not in acute distress.     Appearance: Normal appearance. He is not ill-appearing, toxic-appearing or diaphoretic.   Neck:      Comments: Paravertebral muscle spasm in the upper thoracic region on the right.  Cardiovascular:      Rate and Rhythm: Normal rate and regular rhythm.      Pulses: Normal pulses.      Heart sounds: Normal heart sounds.   Pulmonary:      Effort: Pulmonary effort is normal.      Breath sounds: Normal breath sounds.   Musculoskeletal:      Cervical back: Tenderness present.   Neurological:      Mental Status: He is alert.

## 2024-01-20 ENCOUNTER — PATIENT MESSAGE (OUTPATIENT)
Dept: FAMILY MEDICINE CLINIC | Facility: CLINIC | Age: 54
End: 2024-01-20

## 2024-01-20 DIAGNOSIS — M25.511 ACUTE PAIN OF RIGHT SHOULDER: ICD-10-CM

## 2024-01-20 DIAGNOSIS — M54.12 CERVICAL RADICULOPATHY: Primary | ICD-10-CM

## 2024-01-23 RX ORDER — MELOXICAM 15 MG/1
15 TABLET ORAL DAILY PRN
Qty: 30 TABLET | Refills: 1 | Status: SHIPPED | OUTPATIENT
Start: 2024-01-23

## 2024-01-23 RX ORDER — CYCLOBENZAPRINE HCL 10 MG
10 TABLET ORAL 3 TIMES DAILY PRN
Qty: 30 TABLET | Refills: 1 | Status: SHIPPED | OUTPATIENT
Start: 2024-01-23

## 2024-02-23 ENCOUNTER — HOSPITAL ENCOUNTER (OUTPATIENT)
Dept: NUCLEAR MEDICINE | Facility: HOSPITAL | Age: 54
Discharge: HOME | End: 2024-02-23
Attending: STUDENT IN AN ORGANIZED HEALTH CARE EDUCATION/TRAINING PROGRAM
Payer: COMMERCIAL

## 2024-02-23 DIAGNOSIS — N13.1 HYDRONEPHROSIS DUE TO OBSTRUCTION OF URETER: ICD-10-CM

## 2024-02-23 DIAGNOSIS — N20.1 LEFT URETERAL STONE: ICD-10-CM

## 2024-02-23 PROCEDURE — G1004 CDSM NDSC: HCPCS

## 2024-02-23 PROCEDURE — A9562 TC99M MERTIATIDE: HCPCS

## 2024-02-23 PROCEDURE — 78708 K FLOW/FUNCT IMAGE W/DRUG: CPT

## 2024-02-23 RX ORDER — FUROSEMIDE 10 MG/ML
40 INJECTION INTRAMUSCULAR; INTRAVENOUS
Status: COMPLETED | OUTPATIENT
Start: 2024-02-23 | End: 2024-02-23

## 2024-02-23 RX ADMIN — FUROSEMIDE 40 MG: 10 INJECTION, SOLUTION INTRAMUSCULAR; INTRAVENOUS at 13:48

## 2024-03-05 ENCOUNTER — TELEPHONE (OUTPATIENT)
Dept: UROLOGY | Facility: MEDICAL CENTER | Age: 54
End: 2024-03-05

## 2024-03-05 NOTE — TELEPHONE ENCOUNTER
Called pt to reschedule his appt with Dr. Dee for 3/13/24.      Patient was not at home and will call the office back to reschedule his appt. He had [facility] kidney flow and function done last week

## 2024-03-06 DIAGNOSIS — Z00.00 WELL ADULT EXAM: ICD-10-CM

## 2024-03-06 RX ORDER — SILDENAFIL 100 MG/1
TABLET, FILM COATED ORAL
Qty: 10 TABLET | Refills: 0 | Status: SHIPPED | OUTPATIENT
Start: 2024-03-06

## 2024-04-05 DIAGNOSIS — Z00.00 WELL ADULT EXAM: ICD-10-CM

## 2024-04-06 RX ORDER — SILDENAFIL 100 MG/1
TABLET, FILM COATED ORAL
Qty: 10 TABLET | Refills: 0 | Status: SHIPPED | OUTPATIENT
Start: 2024-04-06

## 2024-04-17 ENCOUNTER — OFFICE VISIT (OUTPATIENT)
Dept: FAMILY MEDICINE CLINIC | Facility: CLINIC | Age: 54
End: 2024-04-17
Payer: COMMERCIAL

## 2024-04-17 VITALS
TEMPERATURE: 100.1 F | WEIGHT: 159.8 LBS | BODY MASS INDEX: 23.67 KG/M2 | DIASTOLIC BLOOD PRESSURE: 86 MMHG | OXYGEN SATURATION: 97 % | SYSTOLIC BLOOD PRESSURE: 148 MMHG | HEIGHT: 69 IN | HEART RATE: 76 BPM

## 2024-04-17 DIAGNOSIS — L98.9 SKIN LESION: ICD-10-CM

## 2024-04-17 DIAGNOSIS — R07.81 RIB PAIN ON RIGHT SIDE: ICD-10-CM

## 2024-04-17 DIAGNOSIS — N28.9 ABNORMAL KIDNEY FUNCTION: ICD-10-CM

## 2024-04-17 DIAGNOSIS — J01.00 ACUTE NON-RECURRENT MAXILLARY SINUSITIS: Primary | ICD-10-CM

## 2024-04-17 DIAGNOSIS — Z00.00 WELL ADULT EXAM: ICD-10-CM

## 2024-04-17 PROCEDURE — 99214 OFFICE O/P EST MOD 30 MIN: CPT | Performed by: FAMILY MEDICINE

## 2024-04-17 PROCEDURE — 99396 PREV VISIT EST AGE 40-64: CPT | Performed by: FAMILY MEDICINE

## 2024-04-17 RX ORDER — AMOXICILLIN AND CLAVULANATE POTASSIUM 875; 125 MG/1; MG/1
1 TABLET, FILM COATED ORAL EVERY 12 HOURS SCHEDULED
Qty: 14 TABLET | Refills: 0 | Status: SHIPPED | OUTPATIENT
Start: 2024-04-17 | End: 2024-04-24

## 2024-04-17 NOTE — PROGRESS NOTES
Assessment/Plan: Wellness exam done.  Patient vaccines reviewed.  Patient due for Shingrix.  Adacel shot up-to-date.  Patient will come in for Shingrix No. 1 when healthy.  Patient will have second shingles shot in 2 months.  Patient will laboratory studies done.  Prostate cancer screening up-to-date.  Patient go for rib series due to rib pain on the right.  Patient will go for laboratory studies.  Colonoscopy up-to-date.  Patient will see dermatology for skin lesions.  Patient will follow with urology.  Patient will follow-up yearly or as needed       Diagnoses and all orders for this visit:    Acute non-recurrent maxillary sinusitis  -     amoxicillin-clavulanate (AUGMENTIN) 875-125 mg per tablet; Take 1 tablet by mouth every 12 (twelve) hours for 7 days    Well adult exam  -     CBC and differential; Future  -     Comprehensive metabolic panel; Future  -     Lipid panel; Future  -     TSH, 3rd generation with Free T4 reflex; Future    Rib pain on right side  -     XR ribs right w pa chest min 3 views; Future    Skin lesion  -     Ambulatory Referral to Dermatology; Future    Abnormal kidney function  -     Ambulatory Referral to Urology; Future            Subjective:        Patient ID: Toby Izquierdo is a 53 y.o. male.      Patient is here for wellness exam.  Patient also with head congestion pressure postnasal drip along with cough over the past few days.  No fever noted.  No vomiting or diarrhea associated with this.  Patient is using Mucinex.  Patient also here for wellness exam.  Labs and vaccines reviewed.  Patient does get some right upper chest pain worse with movement.  No pain at this time.  Patient wishes to see dermatologist given moles.          The following portions of the patient's history were reviewed and updated as appropriate: allergies, current medications, past family history, past medical history, past social history, past surgical history and problem list.      Review of Systems  "  Constitutional: Negative.  Negative for fever.   HENT:  Positive for congestion, postnasal drip, rhinorrhea, sinus pressure and sinus pain.    Eyes: Negative.    Respiratory: Negative.     Cardiovascular:  Positive for chest pain.   Gastrointestinal: Negative.    Endocrine: Negative.    Genitourinary: Negative.    Musculoskeletal: Negative.    Skin:  Positive for color change.   Allergic/Immunologic: Negative.    Neurological: Negative.    Hematological: Negative.    Psychiatric/Behavioral: Negative.             Objective:        Depression Screening and Follow-up Plan: Patient was screened for depression during today's encounter. They screened negative with a PHQ-2 score of 0.            /86 (BP Location: Left arm, Patient Position: Sitting, Cuff Size: Standard)   Pulse 76   Temp 100.1 °F (37.8 °C) (Temporal)   Ht 5' 9\" (1.753 m)   Wt 72.5 kg (159 lb 12.8 oz)   SpO2 97%   BMI 23.60 kg/m²          Physical Exam  Vitals and nursing note reviewed.   Constitutional:       General: He is not in acute distress.     Appearance: Normal appearance. He is not ill-appearing, toxic-appearing or diaphoretic.   HENT:      Head: Normocephalic and atraumatic.      Right Ear: Tympanic membrane, ear canal and external ear normal. There is no impacted cerumen.      Left Ear: Tympanic membrane, ear canal and external ear normal. There is no impacted cerumen.      Nose: Rhinorrhea present. No congestion.      Mouth/Throat:      Mouth: Mucous membranes are moist.      Pharynx: Oropharyngeal exudate and posterior oropharyngeal erythema present.   Eyes:      General: No scleral icterus.        Right eye: No discharge.         Left eye: No discharge.   Neck:      Vascular: No carotid bruit.   Cardiovascular:      Rate and Rhythm: Normal rate and regular rhythm.      Pulses: Normal pulses.      Heart sounds: Normal heart sounds. No murmur heard.     No friction rub. No gallop.   Pulmonary:      Effort: Pulmonary effort is " normal. No respiratory distress.      Breath sounds: Normal breath sounds. No stridor. No wheezing, rhonchi or rales.   Chest:      Chest wall: No tenderness.   Musculoskeletal:         General: No swelling, tenderness, deformity or signs of injury. Normal range of motion.      Cervical back: Normal range of motion and neck supple. No rigidity. No muscular tenderness.      Right lower leg: No edema.      Left lower leg: No edema.   Lymphadenopathy:      Cervical: No cervical adenopathy.   Skin:     General: Skin is warm and dry.      Capillary Refill: Capillary refill takes less than 2 seconds.      Coloration: Skin is not jaundiced.      Findings: Lesion present. No bruising, erythema or rash.   Neurological:      Mental Status: He is alert and oriented to person, place, and time. Mental status is at baseline.      Cranial Nerves: No cranial nerve deficit.      Sensory: No sensory deficit.      Motor: No weakness.      Coordination: Coordination normal.      Gait: Gait normal.   Psychiatric:         Mood and Affect: Mood normal.         Behavior: Behavior normal.         Thought Content: Thought content normal.         Judgment: Judgment normal.

## 2024-04-27 ENCOUNTER — APPOINTMENT (OUTPATIENT)
Dept: RADIOLOGY | Facility: MEDICAL CENTER | Age: 54
End: 2024-04-27
Payer: COMMERCIAL

## 2024-04-27 ENCOUNTER — APPOINTMENT (OUTPATIENT)
Dept: LAB | Facility: MEDICAL CENTER | Age: 54
End: 2024-04-27
Payer: COMMERCIAL

## 2024-04-27 DIAGNOSIS — R07.81 RIB PAIN ON RIGHT SIDE: ICD-10-CM

## 2024-04-27 DIAGNOSIS — Z00.00 WELL ADULT EXAM: ICD-10-CM

## 2024-04-27 LAB
ALBUMIN SERPL BCP-MCNC: 4.3 G/DL (ref 3.5–5)
ALP SERPL-CCNC: 55 U/L (ref 34–104)
ALT SERPL W P-5'-P-CCNC: 21 U/L (ref 7–52)
ANION GAP SERPL CALCULATED.3IONS-SCNC: 8 MMOL/L (ref 4–13)
AST SERPL W P-5'-P-CCNC: 22 U/L (ref 13–39)
BASOPHILS # BLD AUTO: 0.08 THOUSANDS/ÂΜL (ref 0–0.1)
BASOPHILS NFR BLD AUTO: 1 % (ref 0–1)
BILIRUB SERPL-MCNC: 0.69 MG/DL (ref 0.2–1)
BUN SERPL-MCNC: 13 MG/DL (ref 5–25)
CALCIUM SERPL-MCNC: 9.3 MG/DL (ref 8.4–10.2)
CHLORIDE SERPL-SCNC: 104 MMOL/L (ref 96–108)
CHOLEST SERPL-MCNC: 184 MG/DL
CO2 SERPL-SCNC: 28 MMOL/L (ref 21–32)
CREAT SERPL-MCNC: 1.04 MG/DL (ref 0.6–1.3)
EOSINOPHIL # BLD AUTO: 0.43 THOUSAND/ÂΜL (ref 0–0.61)
EOSINOPHIL NFR BLD AUTO: 7 % (ref 0–6)
ERYTHROCYTE [DISTWIDTH] IN BLOOD BY AUTOMATED COUNT: 12.6 % (ref 11.6–15.1)
GFR SERPL CREATININE-BSD FRML MDRD: 81 ML/MIN/1.73SQ M
GLUCOSE P FAST SERPL-MCNC: 99 MG/DL (ref 65–99)
HCT VFR BLD AUTO: 47 % (ref 36.5–49.3)
HDLC SERPL-MCNC: 58 MG/DL
HGB BLD-MCNC: 15.7 G/DL (ref 12–17)
IMM GRANULOCYTES # BLD AUTO: 0.02 THOUSAND/UL (ref 0–0.2)
IMM GRANULOCYTES NFR BLD AUTO: 0 % (ref 0–2)
LDLC SERPL CALC-MCNC: 115 MG/DL (ref 0–100)
LYMPHOCYTES # BLD AUTO: 1.19 THOUSANDS/ÂΜL (ref 0.6–4.47)
LYMPHOCYTES NFR BLD AUTO: 20 % (ref 14–44)
MCH RBC QN AUTO: 31.5 PG (ref 26.8–34.3)
MCHC RBC AUTO-ENTMCNC: 33.4 G/DL (ref 31.4–37.4)
MCV RBC AUTO: 94 FL (ref 82–98)
MONOCYTES # BLD AUTO: 0.65 THOUSAND/ÂΜL (ref 0.17–1.22)
MONOCYTES NFR BLD AUTO: 11 % (ref 4–12)
NEUTROPHILS # BLD AUTO: 3.47 THOUSANDS/ÂΜL (ref 1.85–7.62)
NEUTS SEG NFR BLD AUTO: 61 % (ref 43–75)
NONHDLC SERPL-MCNC: 126 MG/DL
NRBC BLD AUTO-RTO: 0 /100 WBCS
PLATELET # BLD AUTO: 258 THOUSANDS/UL (ref 149–390)
PMV BLD AUTO: 10.2 FL (ref 8.9–12.7)
POTASSIUM SERPL-SCNC: 4.4 MMOL/L (ref 3.5–5.3)
PROT SERPL-MCNC: 6.9 G/DL (ref 6.4–8.4)
RBC # BLD AUTO: 4.98 MILLION/UL (ref 3.88–5.62)
SODIUM SERPL-SCNC: 140 MMOL/L (ref 135–147)
TRIGL SERPL-MCNC: 54 MG/DL
TSH SERPL DL<=0.05 MIU/L-ACNC: 1.84 UIU/ML (ref 0.45–4.5)
WBC # BLD AUTO: 5.84 THOUSAND/UL (ref 4.31–10.16)

## 2024-04-27 PROCEDURE — 80053 COMPREHEN METABOLIC PANEL: CPT

## 2024-04-27 PROCEDURE — 36415 COLL VENOUS BLD VENIPUNCTURE: CPT

## 2024-04-27 PROCEDURE — 80061 LIPID PANEL: CPT

## 2024-04-27 PROCEDURE — 71101 X-RAY EXAM UNILAT RIBS/CHEST: CPT

## 2024-04-27 PROCEDURE — 85025 COMPLETE CBC W/AUTO DIFF WBC: CPT

## 2024-04-27 PROCEDURE — 84443 ASSAY THYROID STIM HORMONE: CPT

## 2024-05-17 PROBLEM — Z00.00 WELL ADULT EXAM: Status: RESOLVED | Noted: 2024-04-17 | Resolved: 2024-05-17

## 2024-05-17 PROBLEM — J01.00 ACUTE NON-RECURRENT MAXILLARY SINUSITIS: Status: RESOLVED | Noted: 2024-04-17 | Resolved: 2024-05-17

## 2024-05-24 ENCOUNTER — TELEPHONE (OUTPATIENT)
Age: 54
End: 2024-05-24

## 2024-05-24 NOTE — TELEPHONE ENCOUNTER
Patient called to make an apt for moles on back/referral in as routine    Offered first available/declined

## 2024-06-18 ENCOUNTER — OFFICE VISIT (OUTPATIENT)
Dept: FAMILY MEDICINE CLINIC | Facility: CLINIC | Age: 54
End: 2024-06-18
Payer: COMMERCIAL

## 2024-06-18 VITALS
TEMPERATURE: 100 F | OXYGEN SATURATION: 97 % | DIASTOLIC BLOOD PRESSURE: 86 MMHG | SYSTOLIC BLOOD PRESSURE: 142 MMHG | HEIGHT: 69 IN | WEIGHT: 158 LBS | HEART RATE: 72 BPM | BODY MASS INDEX: 23.4 KG/M2

## 2024-06-18 DIAGNOSIS — J01.00 ACUTE NON-RECURRENT MAXILLARY SINUSITIS: Primary | ICD-10-CM

## 2024-06-18 DIAGNOSIS — R05.1 ACUTE COUGH: ICD-10-CM

## 2024-06-18 PROCEDURE — 99213 OFFICE O/P EST LOW 20 MIN: CPT | Performed by: FAMILY MEDICINE

## 2024-06-18 PROCEDURE — 87636 SARSCOV2 & INF A&B AMP PRB: CPT | Performed by: FAMILY MEDICINE

## 2024-06-18 RX ORDER — AMOXICILLIN AND CLAVULANATE POTASSIUM 875; 125 MG/1; MG/1
1 TABLET, FILM COATED ORAL EVERY 12 HOURS SCHEDULED
Qty: 14 TABLET | Refills: 0 | Status: SHIPPED | OUTPATIENT
Start: 2024-06-18 | End: 2024-06-25

## 2024-06-18 NOTE — PROGRESS NOTES
"Assessment/Plan: Patient will rest and increase fluids.  Patient will use Tylenol or Motrin as needed and start Augmentin as directed.  COVID test done at this time.       Diagnoses and all orders for this visit:    Acute non-recurrent maxillary sinusitis  -     amoxicillin-clavulanate (AUGMENTIN) 875-125 mg per tablet; Take 1 tablet by mouth every 12 (twelve) hours for 7 days    Acute cough            Subjective:        Patient ID: Toby Izquierdo is a 53 y.o. male.      Patient is here with facial pain and postnasal drip rhinorrhea over the past 2 days.  Temp 100.  Patient to use DayQuil.  Patient with postnasal drip.  No significant cough.  No vomiting or diarrhea.  COVID test negative    Fatigue  Associated symptoms include congestion and fatigue.         The following portions of the patient's history were reviewed and updated as appropriate: allergies, current medications, past family history, past medical history, past social history, past surgical history and problem list.      Review of Systems   Constitutional:  Positive for fatigue.   HENT:  Positive for congestion, postnasal drip, rhinorrhea, sinus pressure and sinus pain.    Eyes: Negative.    Respiratory: Negative.     Cardiovascular: Negative.    Gastrointestinal: Negative.    Endocrine: Negative.    Genitourinary: Negative.    Musculoskeletal: Negative.    Skin: Negative.    Allergic/Immunologic: Negative.    Neurological: Negative.    Hematological: Negative.    Psychiatric/Behavioral: Negative.             Objective:               /86 (BP Location: Left arm, Patient Position: Sitting, Cuff Size: Standard)   Pulse 72   Temp 100 °F (37.8 °C) (Temporal)   Ht 5' 9\" (1.753 m)   Wt 71.7 kg (158 lb)   SpO2 97%   BMI 23.33 kg/m²          Physical Exam  Vitals and nursing note reviewed.   Constitutional:       General: He is not in acute distress.     Appearance: He is ill-appearing. He is not toxic-appearing or diaphoretic.   HENT:      Head: " Normocephalic and atraumatic.      Right Ear: Tympanic membrane, ear canal and external ear normal. There is no impacted cerumen.      Left Ear: Tympanic membrane, ear canal and external ear normal. There is no impacted cerumen.      Nose: Nose normal. No congestion or rhinorrhea.      Mouth/Throat:      Mouth: Mucous membranes are moist.      Pharynx: Oropharyngeal exudate and posterior oropharyngeal erythema present.   Eyes:      General: No scleral icterus.        Right eye: No discharge.         Left eye: No discharge.   Neck:      Vascular: No carotid bruit.   Cardiovascular:      Rate and Rhythm: Normal rate and regular rhythm.      Pulses: Normal pulses.      Heart sounds: Normal heart sounds. No murmur heard.     No friction rub. No gallop.   Pulmonary:      Effort: Pulmonary effort is normal. No respiratory distress.      Breath sounds: No stridor. Wheezing present. No rhonchi or rales.   Chest:      Chest wall: No tenderness.   Musculoskeletal:         General: No swelling, tenderness, deformity or signs of injury. Normal range of motion.      Cervical back: Normal range of motion and neck supple. No rigidity. No muscular tenderness.      Right lower leg: No edema.      Left lower leg: No edema.   Lymphadenopathy:      Cervical: No cervical adenopathy.   Skin:     General: Skin is warm and dry.      Capillary Refill: Capillary refill takes less than 2 seconds.      Coloration: Skin is not jaundiced.      Findings: No bruising, erythema, lesion or rash.   Neurological:      Mental Status: He is alert and oriented to person, place, and time. Mental status is at baseline.      Cranial Nerves: No cranial nerve deficit.      Sensory: No sensory deficit.      Motor: No weakness.      Coordination: Coordination normal.      Gait: Gait normal.   Psychiatric:         Mood and Affect: Mood normal.         Behavior: Behavior normal.         Thought Content: Thought content normal.         Judgment: Judgment normal.

## 2024-06-19 LAB
FLUAV RNA RESP QL NAA+PROBE: NEGATIVE
FLUBV RNA RESP QL NAA+PROBE: NEGATIVE
SARS-COV-2 RNA RESP QL NAA+PROBE: NEGATIVE

## 2024-06-22 DIAGNOSIS — Z00.00 WELL ADULT EXAM: ICD-10-CM

## 2024-06-22 RX ORDER — SILDENAFIL 100 MG/1
TABLET, FILM COATED ORAL
Qty: 10 TABLET | Refills: 5 | Status: SHIPPED | OUTPATIENT
Start: 2024-06-22

## 2024-07-18 PROBLEM — J01.00 ACUTE NON-RECURRENT MAXILLARY SINUSITIS: Status: RESOLVED | Noted: 2024-06-18 | Resolved: 2024-07-18

## 2024-07-18 PROBLEM — R05.1 ACUTE COUGH: Status: RESOLVED | Noted: 2024-06-18 | Resolved: 2024-07-18

## 2024-08-18 DIAGNOSIS — Z00.00 WELL ADULT EXAM: ICD-10-CM

## 2024-08-18 RX ORDER — SILDENAFIL 100 MG/1
100 TABLET, FILM COATED ORAL AS NEEDED
Qty: 10 TABLET | Refills: 0 | Status: SHIPPED | OUTPATIENT
Start: 2024-08-18

## 2024-09-17 ENCOUNTER — OFFICE VISIT (OUTPATIENT)
Dept: FAMILY MEDICINE CLINIC | Facility: CLINIC | Age: 54
End: 2024-09-17
Payer: COMMERCIAL

## 2024-09-17 VITALS
SYSTOLIC BLOOD PRESSURE: 126 MMHG | RESPIRATION RATE: 16 BRPM | TEMPERATURE: 97.9 F | BODY MASS INDEX: 23.82 KG/M2 | OXYGEN SATURATION: 98 % | WEIGHT: 160.8 LBS | DIASTOLIC BLOOD PRESSURE: 86 MMHG | HEART RATE: 69 BPM | HEIGHT: 69 IN

## 2024-09-17 DIAGNOSIS — M54.14 THORACIC RADICULOPATHY: Primary | ICD-10-CM

## 2024-09-17 PROCEDURE — 99213 OFFICE O/P EST LOW 20 MIN: CPT | Performed by: FAMILY MEDICINE

## 2024-09-17 RX ORDER — NAPROXEN 500 MG/1
500 TABLET ORAL 2 TIMES DAILY WITH MEALS
Qty: 60 TABLET | Refills: 0 | Status: SHIPPED | OUTPATIENT
Start: 2024-09-17

## 2024-09-17 RX ORDER — CYCLOBENZAPRINE HCL 10 MG
10 TABLET ORAL
Qty: 30 TABLET | Refills: 0 | Status: SHIPPED | OUTPATIENT
Start: 2024-09-17

## 2024-09-17 NOTE — PROGRESS NOTES
"Assessment/Plan:       Diagnoses and all orders for this visit:    Thoracic radiculopathy  -     cyclobenzaprine (FLEXERIL) 10 mg tablet; Take 1 tablet (10 mg total) by mouth daily at bedtime  -     naproxen (Naprosyn) 500 mg tablet; Take 1 tablet (500 mg total) by mouth 2 (two) times a day with meals            Subjective:        Patient ID: Toby Izquierdo is a 53 y.o. male.      Patient is here with thoracic back pain and on the left with radiation to the left arm for the past 3 weeks or so.  Patient was on a ladder trimming [email or web address].  No other direct trauma noted.  Patient using ibuprofen.  No chest pain shortness of breath neck pain associated with this.    Back Pain          The following portions of the patient's history were reviewed and updated as appropriate: allergies, current medications, past family history, past medical history, past social history, past surgical history and problem list.      Review of Systems   Constitutional: Negative.    HENT: Negative.     Eyes: Negative.    Respiratory: Negative.     Cardiovascular: Negative.    Gastrointestinal: Negative.    Endocrine: Negative.    Genitourinary: Negative.    Musculoskeletal:  Positive for back pain.   Skin: Negative.    Allergic/Immunologic: Negative.    Neurological: Negative.    Hematological: Negative.    Psychiatric/Behavioral: Negative.             Objective:               /86 (BP Location: Right arm, Patient Position: Sitting, Cuff Size: Large)   Pulse 69   Temp 97.9 °F (36.6 °C) (Temporal)   Resp 16   Ht 5' 9\" (1.753 m)   Wt 72.9 kg (160 lb 12.8 oz)   SpO2 98%   BMI 23.75 kg/m²          Physical Exam  Vitals and nursing note reviewed.   Constitutional:       General: He is not in acute distress.     Appearance: Normal appearance. He is not ill-appearing, toxic-appearing or diaphoretic.   Cardiovascular:      Rate and Rhythm: Normal rate and regular rhythm.      Pulses: Normal pulses.      Heart sounds: Normal heart sounds. "   Pulmonary:      Effort: Pulmonary effort is normal.      Breath sounds: Normal breath sounds.   Musculoskeletal:         General: Tenderness present.      Comments: Left posterior thoracic pain superiorly.  Patient with pain in part due to muscle spasm   Neurological:      Mental Status: He is alert.

## 2024-10-23 ENCOUNTER — PATIENT MESSAGE (OUTPATIENT)
Age: 54
End: 2024-10-23

## 2024-10-23 DIAGNOSIS — M54.12 CERVICAL RADICULOPATHY: Primary | ICD-10-CM

## 2024-10-23 RX ORDER — METHYLPREDNISOLONE 4 MG/1
TABLET ORAL
Qty: 21 EACH | Refills: 0 | Status: SHIPPED | OUTPATIENT
Start: 2024-10-23

## 2025-02-19 ENCOUNTER — RA CDI HCC (OUTPATIENT)
Dept: OTHER | Facility: HOSPITAL | Age: 55
End: 2025-02-19

## 2025-02-19 NOTE — PROGRESS NOTES
HCC coding opportunities       Chart reviewed, no opportunity found: CHART REVIEWED, NO OPPORTUNITY FOUND        Patients Insurance        Commercial Insurance: [employer]       This is a reminder to assess ((resolve/update/assess)  all HCC (risk adjustment) codes for the year 2025 as patients MELIZA scores reset to zero with the New year.    Also, just a reminder to please review and assess all other chronic conditions for 2025.

## 2025-02-21 ENCOUNTER — OFFICE VISIT (OUTPATIENT)
Age: 55
End: 2025-02-21
Payer: COMMERCIAL

## 2025-02-21 VITALS
TEMPERATURE: 97.7 F | RESPIRATION RATE: 16 BRPM | HEIGHT: 69 IN | DIASTOLIC BLOOD PRESSURE: 82 MMHG | HEART RATE: 62 BPM | WEIGHT: 160 LBS | SYSTOLIC BLOOD PRESSURE: 124 MMHG | BODY MASS INDEX: 23.7 KG/M2 | OXYGEN SATURATION: 99 %

## 2025-02-21 DIAGNOSIS — D49.2 SKIN NEOPLASM: ICD-10-CM

## 2025-02-21 DIAGNOSIS — M54.14 THORACIC RADICULOPATHY: ICD-10-CM

## 2025-02-21 DIAGNOSIS — Z00.00 WELL ADULT EXAM: ICD-10-CM

## 2025-02-21 DIAGNOSIS — M54.12 CERVICAL RADICULOPATHY: Primary | ICD-10-CM

## 2025-02-21 PROCEDURE — 99214 OFFICE O/P EST MOD 30 MIN: CPT | Performed by: FAMILY MEDICINE

## 2025-02-21 NOTE — PROGRESS NOTES
Assessment/Plan:       Diagnoses and all orders for this visit:    Cervical radiculopathy  Comments:  Improved significantly.  Patient will use medications only as needed.    Thoracic radiculopathy  Comments:  Stable at this time.  Patient will use naproxen as needed.    Well adult exam  Comments:  Patient will have wellness exam done in May.  Patient will have labs done prior to examination  Orders:  -     Comprehensive metabolic panel; Future  -     CBC and differential; Future  -     TSH, 3rd generation with Free T4 reflex; Future  -     Lipid panel; Future  -     PSA, Total Screen; Future    Skin neoplasm  Comments:  Patient will be set up for removal of lesions            Subjective:        Patient ID: Toby Izquierdo is a 54 y.o. male.      Patient is here to follow-up on cervical radiculopathy and thoracic radiculopathy.  These have been stable at this time.  No significant neck pain or back pain today but does get pain intermittently.  Patient presently not on any medication but does use intermittently.  Patient otherwise in normal state of health.  Patient wants to eventually have laboratory studies done.  Patient with lesion on anterior neck that he wants evaluated as well as lesions on back.          The following portions of the patient's history were reviewed and updated as appropriate: allergies, current medications, past family history, past medical history, past social history, past surgical history and problem list.      Review of Systems   Constitutional: Negative.    HENT: Negative.     Eyes: Negative.    Respiratory: Negative.     Cardiovascular: Negative.    Gastrointestinal: Negative.    Endocrine: Negative.    Genitourinary: Negative.    Musculoskeletal: Negative.    Skin:  Positive for color change.   Allergic/Immunologic: Negative.    Neurological: Negative.    Hematological: Negative.    Psychiatric/Behavioral: Negative.             Objective:        Depression Screening and Follow-up  "Plan: Patient was screened for depression during today's encounter. They screened negative with a PHQ-2 score of 0.              /82 (BP Location: Right arm, Patient Position: Sitting, Cuff Size: Adult)   Pulse 62   Temp 97.7 °F (36.5 °C) (Temporal)   Resp 16   Ht 5' 9\" (1.753 m)   Wt 72.6 kg (160 lb)   SpO2 99%   BMI 23.63 kg/m²          Physical Exam  Vitals and nursing note reviewed.   Constitutional:       General: He is not in acute distress.     Appearance: Normal appearance. He is not ill-appearing, toxic-appearing or diaphoretic.   HENT:      Head: Normocephalic and atraumatic.      Right Ear: Tympanic membrane, ear canal and external ear normal. There is no impacted cerumen.      Left Ear: Tympanic membrane, ear canal and external ear normal. There is no impacted cerumen.      Nose: Nose normal. No congestion or rhinorrhea.      Mouth/Throat:      Mouth: Mucous membranes are moist.      Pharynx: No oropharyngeal exudate or posterior oropharyngeal erythema.   Eyes:      General: No scleral icterus.        Right eye: No discharge.         Left eye: No discharge.   Neck:      Vascular: No carotid bruit.   Cardiovascular:      Rate and Rhythm: Normal rate and regular rhythm.      Pulses: Normal pulses.      Heart sounds: Normal heart sounds. No murmur heard.     No friction rub. No gallop.   Pulmonary:      Effort: Pulmonary effort is normal. No respiratory distress.      Breath sounds: Normal breath sounds. No stridor. No wheezing, rhonchi or rales.   Chest:      Chest wall: No tenderness.   Abdominal:      Tenderness: There is no rebound.   Musculoskeletal:         General: No swelling, tenderness, deformity or signs of injury. Normal range of motion.      Cervical back: Normal range of motion and neck supple. No rigidity. No muscular tenderness.      Right lower leg: No edema.      Left lower leg: No edema.   Lymphadenopathy:      Cervical: No cervical adenopathy.   Skin:     General: Skin is " warm and dry.      Capillary Refill: Capillary refill takes less than 2 seconds.      Coloration: Skin is not jaundiced.      Findings: Lesion present. No bruising, erythema or rash.      Comments: Irregular dark lesion anterior neck midline.  2 irregular lesions 1 on left posterior shoulder and right posterior shoulder and right thoracic region sterilely   Neurological:      General: No focal deficit present.      Mental Status: He is alert and oriented to person, place, and time.      Cranial Nerves: No cranial nerve deficit.      Sensory: No sensory deficit.      Motor: No weakness.      Coordination: Coordination normal.      Gait: Gait normal.   Psychiatric:         Mood and Affect: Mood normal.         Behavior: Behavior normal.         Thought Content: Thought content normal.         Judgment: Judgment normal.

## 2025-04-26 ENCOUNTER — APPOINTMENT (OUTPATIENT)
Dept: LAB | Facility: MEDICAL CENTER | Age: 55
End: 2025-04-26
Payer: COMMERCIAL

## 2025-04-26 DIAGNOSIS — Z00.00 WELL ADULT EXAM: ICD-10-CM

## 2025-04-26 LAB
ALBUMIN SERPL BCG-MCNC: 4.1 G/DL (ref 3.5–5)
ALP SERPL-CCNC: 60 U/L (ref 34–104)
ALT SERPL W P-5'-P-CCNC: 18 U/L (ref 7–52)
ANION GAP SERPL CALCULATED.3IONS-SCNC: 6 MMOL/L (ref 4–13)
AST SERPL W P-5'-P-CCNC: 17 U/L (ref 13–39)
BASOPHILS # BLD AUTO: 0.07 THOUSANDS/ÂΜL (ref 0–0.1)
BASOPHILS NFR BLD AUTO: 1 % (ref 0–1)
BILIRUB SERPL-MCNC: 1.03 MG/DL (ref 0.2–1)
BUN SERPL-MCNC: 18 MG/DL (ref 5–25)
CALCIUM SERPL-MCNC: 9.2 MG/DL (ref 8.4–10.2)
CHLORIDE SERPL-SCNC: 102 MMOL/L (ref 96–108)
CHOLEST SERPL-MCNC: 197 MG/DL (ref ?–200)
CO2 SERPL-SCNC: 29 MMOL/L (ref 21–32)
CREAT SERPL-MCNC: 1.08 MG/DL (ref 0.6–1.3)
EOSINOPHIL # BLD AUTO: 0.43 THOUSAND/ÂΜL (ref 0–0.61)
EOSINOPHIL NFR BLD AUTO: 8 % (ref 0–6)
ERYTHROCYTE [DISTWIDTH] IN BLOOD BY AUTOMATED COUNT: 13.1 % (ref 11.6–15.1)
GFR SERPL CREATININE-BSD FRML MDRD: 76 ML/MIN/1.73SQ M
GLUCOSE P FAST SERPL-MCNC: 103 MG/DL (ref 65–99)
HCT VFR BLD AUTO: 45.7 % (ref 36.5–49.3)
HDLC SERPL-MCNC: 58 MG/DL
HGB BLD-MCNC: 15.6 G/DL (ref 12–17)
IMM GRANULOCYTES # BLD AUTO: 0.01 THOUSAND/UL (ref 0–0.2)
IMM GRANULOCYTES NFR BLD AUTO: 0 % (ref 0–2)
LDLC SERPL CALC-MCNC: 123 MG/DL (ref 0–100)
LYMPHOCYTES # BLD AUTO: 1.14 THOUSANDS/ÂΜL (ref 0.6–4.47)
LYMPHOCYTES NFR BLD AUTO: 20 % (ref 14–44)
MCH RBC QN AUTO: 31.8 PG (ref 26.8–34.3)
MCHC RBC AUTO-ENTMCNC: 34.1 G/DL (ref 31.4–37.4)
MCV RBC AUTO: 93 FL (ref 82–98)
MONOCYTES # BLD AUTO: 0.54 THOUSAND/ÂΜL (ref 0.17–1.22)
MONOCYTES NFR BLD AUTO: 10 % (ref 4–12)
NEUTROPHILS # BLD AUTO: 3.42 THOUSANDS/ÂΜL (ref 1.85–7.62)
NEUTS SEG NFR BLD AUTO: 61 % (ref 43–75)
NONHDLC SERPL-MCNC: 139 MG/DL
NRBC BLD AUTO-RTO: 0 /100 WBCS
PLATELET # BLD AUTO: 223 THOUSANDS/UL (ref 149–390)
PMV BLD AUTO: 10.4 FL (ref 8.9–12.7)
POTASSIUM SERPL-SCNC: 4.4 MMOL/L (ref 3.5–5.3)
PROT SERPL-MCNC: 6.7 G/DL (ref 6.4–8.4)
PSA SERPL-MCNC: 0.97 NG/ML (ref 0–4)
RBC # BLD AUTO: 4.91 MILLION/UL (ref 3.88–5.62)
SODIUM SERPL-SCNC: 137 MMOL/L (ref 135–147)
TRIGL SERPL-MCNC: 80 MG/DL (ref ?–150)
TSH SERPL DL<=0.05 MIU/L-ACNC: 2.75 UIU/ML (ref 0.45–4.5)
WBC # BLD AUTO: 5.61 THOUSAND/UL (ref 4.31–10.16)

## 2025-04-26 PROCEDURE — 80061 LIPID PANEL: CPT

## 2025-04-26 PROCEDURE — 84443 ASSAY THYROID STIM HORMONE: CPT

## 2025-04-26 PROCEDURE — 36415 COLL VENOUS BLD VENIPUNCTURE: CPT

## 2025-04-26 PROCEDURE — 85025 COMPLETE CBC W/AUTO DIFF WBC: CPT

## 2025-04-26 PROCEDURE — G0103 PSA SCREENING: HCPCS

## 2025-04-26 PROCEDURE — 80053 COMPREHEN METABOLIC PANEL: CPT

## 2025-04-27 ENCOUNTER — RESULTS FOLLOW-UP (OUTPATIENT)
Age: 55
End: 2025-04-27

## 2025-05-05 DIAGNOSIS — Z00.00 WELL ADULT EXAM: ICD-10-CM

## 2025-05-06 RX ORDER — SILDENAFIL 100 MG/1
100 TABLET, FILM COATED ORAL AS NEEDED
Qty: 10 TABLET | Refills: 3 | Status: SHIPPED | OUTPATIENT
Start: 2025-05-06

## 2025-05-07 ENCOUNTER — TELEPHONE (OUTPATIENT)
Age: 55
End: 2025-05-07

## 2025-05-07 NOTE — TELEPHONE ENCOUNTER
PA for sildenafil 100mg tablet SUBMITTED to [employer]    via    []CMM-KEY:   [x]Surescripts  []Availity-Auth ID # NDC #   []Faxed to plan   []Other website   []Phone call Case ID #     [x]PA sent as URGENT    All office notes, labs and other pertaining documents and studies sent. Clinical questions answered. Awaiting determination from insurance company.     Turnaround time for your insurance to make a decision on your Prior Authorization can take 7-21 business days.

## 2025-05-09 NOTE — TELEPHONE ENCOUNTER
PA for sildenafil 100mg tablet EXCLUDED from plan     Reason:    Message sent to office clinical pool Yes

## 2025-05-23 ENCOUNTER — OFFICE VISIT (OUTPATIENT)
Age: 55
End: 2025-05-23
Payer: COMMERCIAL

## 2025-05-23 VITALS
WEIGHT: 161 LBS | TEMPERATURE: 98.7 F | SYSTOLIC BLOOD PRESSURE: 140 MMHG | OXYGEN SATURATION: 99 % | DIASTOLIC BLOOD PRESSURE: 84 MMHG | HEART RATE: 66 BPM | HEIGHT: 69 IN | BODY MASS INDEX: 23.85 KG/M2

## 2025-05-23 DIAGNOSIS — Z00.00 ANNUAL PHYSICAL EXAM: Primary | ICD-10-CM

## 2025-05-23 PROCEDURE — 99396 PREV VISIT EST AGE 40-64: CPT | Performed by: FAMILY MEDICINE

## 2025-05-23 NOTE — PROGRESS NOTES
Adult Annual Physical  Name: Toby Izquierdo      : 1970      MRN: [id number]  Encounter Provider: Harsh Villareal DO  Encounter Date: 2025   Encounter department: [facility] PRIMARY [facility]ARE    :  Assessment & Plan  Annual physical exam  Completed at this time.  Patient will consider Shingrix in the future.  Patient will modify diet regarding hyperlipidemia/borderline elevated blood sugars.           Preventive Screenings:  - Diabetes Screening: screening up-to-date and risks/benefits discussed  - Cholesterol Screening: risks/benefits discussed and screening up-to-date   - Hepatitis C screening: risks/benefits discussed   - HIV screening: risks/benefits discussed   - Colon cancer screening: screening up-to-date   - Lung cancer screening: screening not indicated and risks/benefits discussed   - Prostate cancer screening: screening up-to-date and risks/benefits discussed     Immunizations:  - Immunizations due: Zoster (Shingrix)  - Risks/benefits immunizations discussed    - The patient declines recommended vaccines currently despite my recommendations      Counseling/Anticipatory Guidance:  - Alcohol: discussed moderation in alcohol intake and recommendations for healthy alcohol use.   - Drug use: discussed harms of illicit drug use and how it can negatively impact mental/physical health.   - Tobacco use: discussed harms of tobacco use and management options for quitting.   - Dental health: discussed importance of regular tooth brushing, flossing, and dental visits.   - Diet: discussed recommendations for a healthy/well-balanced diet.   - Exercise: the importance of regular exercise/physical activity was discussed. Recommend exercise 3-5 times per week for at least 30 minutes.   - Injury prevention: discussed safety/seat belts, safety helmets, smoke detectors, carbon monoxide detectors, and smoking near bedding or upholstery.       Depression Screening and Follow-up Plan: Patient was screened for  "depression during today's encounter. They screened negative with a PHQ-2 score of 0.          History of Present Illness     Adult Annual Physical:  Patient presents for annual physical. Patient is here for annual wellness exam.  Patient in normal state of health..     Diet and Physical Activity:  - Diet/Nutrition: no special diet.  - Exercise: walking.    Depression Screening:  - PHQ-2 Score: 0    General Health:  - Sleep: sleeps well.  - Hearing: normal hearing right ear and normal hearing left ear.  - Vision: no vision problems.  - Dental: regular dental visits.     Health:  - History of STDs: no.   - Urinary symptoms: none.     Advanced Care Planning:  - Has an advanced directive?: no    - Has a durable medical POA?: no    - ACP document given to patient?: no      Review of Systems   Constitutional: Negative.    HENT: Negative.     Eyes: Negative.    Respiratory: Negative.     Cardiovascular: Negative.    Gastrointestinal: Negative.    Endocrine: Negative.    Genitourinary: Negative.    Musculoskeletal: Negative.    Skin: Negative.    Allergic/Immunologic: Negative.    Neurological: Negative.    Hematological: Negative.    Psychiatric/Behavioral: Negative.           Objective   /84 (BP Location: Right arm, Patient Position: Sitting, Cuff Size: Standard)   Pulse 66   Temp 98.7 °F (37.1 °C) (Temporal)   Ht 5' 9\" (1.753 m)   Wt 73 kg (161 lb)   SpO2 99%   BMI 23.78 kg/m²     Physical Exam  Vitals and nursing note reviewed.   Constitutional:       General: He is not in acute distress.     Appearance: He is well-developed. He is not diaphoretic.   HENT:      Head: Normocephalic and atraumatic.      Right Ear: External ear normal.      Left Ear: External ear normal.      Nose: Nose normal.      Mouth/Throat:      Pharynx: No oropharyngeal exudate.     Eyes:      General:         Right eye: No discharge.         Left eye: No discharge.     Neck:      Thyroid: No thyromegaly.      Vascular: No carotid " bruit.      Trachea: No tracheal deviation.     Cardiovascular:      Rate and Rhythm: Normal rate and regular rhythm.      Heart sounds: Normal heart sounds. No murmur heard.     No gallop.   Pulmonary:      Effort: Pulmonary effort is normal. No respiratory distress.      Breath sounds: Normal breath sounds. No stridor. No wheezing or rales.   Chest:      Chest wall: No tenderness.   Abdominal:      General: Bowel sounds are normal.     Musculoskeletal:         General: No tenderness or deformity. Normal range of motion.      Cervical back: Normal range of motion and neck supple.   Lymphadenopathy:      Cervical: No cervical adenopathy.     Skin:     General: Skin is warm and dry.      Coloration: Skin is not pale.      Findings: No erythema or rash.     Neurological:      Mental Status: He is alert and oriented to person, place, and time. Mental status is at baseline.      Cranial Nerves: No cranial nerve deficit.      Motor: No abnormal muscle tone.      Coordination: Coordination normal.      Deep Tendon Reflexes: Reflexes normal.     Psychiatric:         Behavior: Behavior normal.         Thought Content: Thought content normal.         Judgment: Judgment normal.

## 2025-05-23 NOTE — PATIENT INSTRUCTIONS
"Patient Education     Routine physical for adults   The Basics   Written by the doctors and editors at [facility]   What is a physical? -- A physical is a routine visit, or \"check-up,\" with your doctor. You might also hear it called a \"wellness visit\" or \"preventive visit.\"  During each visit, the doctor will:   Ask about your physical and mental health   Ask about your habits, behaviors, and lifestyle   Do an exam   Give you vaccines if needed   Talk to you about any medicines you take   Give advice about your health   Answer your questions  Getting regular check-ups is an important part of taking care of your health. It can help your doctor find and treat any problems you have. But it's also important for preventing health problems.  A routine physical is different from a \"sick visit.\" A sick visit is when you see a doctor because of a health concern or problem. Since physicals are scheduled ahead of time, you can think about what you want to ask the doctor.  How often should I get a physical? -- It depends on your age and health. In general, for people age 21 years and older:   If you are younger than 50 years, you might be able to get a physical every 3 years.   If you are 50 years or older, your doctor might recommend a physical every year.  If you have an ongoing health condition, like diabetes or high blood pressure, your doctor will probably want to see you more often.  What happens during a physical? -- In general, each visit will include:   Physical exam - The doctor or nurse will check your height, weight, heart rate, and blood pressure. They will also look at your eyes and ears. They will ask about how you are feeling and whether you have any symptoms that bother you.   Medicines - It's a good idea to bring a list of all the medicines you take to each doctor visit. Your doctor will talk to you about your medicines and answer any questions. Tell them if you are having any side effects that bother you. You " "should also tell them if you are having trouble paying for any of your medicines.   Habits and behaviors - This includes:   Your diet   Your exercise habits   Whether you smoke, drink alcohol, or use drugs   Whether you are sexually active   Whether you feel safe at home  Your doctor will talk to you about things you can do to improve your health and lower your risk of health problems. They will also offer help and support. For example, if you want to quit smoking, they can give you advice and might prescribe medicines. If you want to improve your diet or get more physical activity, they can help you with this, too.   Lab tests, if needed - The tests you get will depend on your age and situation. For example, your doctor might want to check your:   Cholesterol   Blood sugar   Iron level   Vaccines - The recommended vaccines will depend on your age, health, and what vaccines you already had. Vaccines are very important because they can prevent certain serious or deadly infections.   Discussion of screening - \"Screening\" means checking for diseases or other health problems before they cause symptoms. Your doctor can recommend screening based on your age, risk, and preferences. This might include tests to check for:   Cancer, such as breast, prostate, cervical, ovarian, colorectal, prostate, lung, or skin cancer   Sexually transmitted infections, such as chlamydia and gonorrhea   Mental health conditions like depression and anxiety  Your doctor will talk to you about the different types of screening tests. They can help you decide which screenings to have. They can also explain what the results might mean.   Answering questions - The physical is a good time to ask the doctor or nurse questions about your health. If needed, they can refer you to other doctors or specialists, too.  Adults older than 65 years often need other care, too. As you get older, your doctor will talk to you about:   How to prevent falling at " home   Hearing or vision tests   Memory testing   How to take your medicines safely   Making sure that you have the help and support you need at home  All topics are updated as new evidence becomes available and our peer review process is complete.  This topic retrieved from [employer] on: May 02, 2024.  Topic 613609 Version 1.0  Release: 32.4.3 - C32.122  © 2024 UpToDate, Inc. and/or its affiliates. All rights reserved.  Consumer Information Use and Disclaimer   Disclaimer: This generalized information is a limited summary of diagnosis, treatment, and/or medication information. It is not meant to be comprehensive and should be used as a tool to help the user understand and/or assess potential diagnostic and treatment options. It does NOT include all information about conditions, treatments, medications, side effects, or risks that may apply to a specific patient. It is not intended to be medical advice or a substitute for the medical advice, diagnosis, or treatment of a health care provider based on the health care provider's examination and assessment of a patient's specific and unique circumstances. Patients must speak with a health care provider for complete information about their health, medical questions, and treatment options, including any risks or benefits regarding use of medications. This information does not endorse any treatments or medications as safe, effective, or approved for treating a specific patient. UpToDate, Inc. and its affiliates disclaim any warranty or liability relating to this information or the use thereof.The use of this information is governed by the Terms of Use, available at https://www.[employer].com/en/know/clinical-effectiveness-terms. 2024© UpToDate, Inc. and its affiliates and/or licensors. All rights reserved.  Copyright   © 2024 UpToDate, Inc. and/or its affiliates. All rights reserved.

## 2025-06-13 ENCOUNTER — PROCEDURE VISIT (OUTPATIENT)
Age: 55
End: 2025-06-13
Payer: COMMERCIAL

## 2025-06-13 VITALS
OXYGEN SATURATION: 98 % | BODY MASS INDEX: 23.55 KG/M2 | WEIGHT: 159 LBS | DIASTOLIC BLOOD PRESSURE: 78 MMHG | HEART RATE: 74 BPM | TEMPERATURE: 98 F | SYSTOLIC BLOOD PRESSURE: 130 MMHG | HEIGHT: 69 IN

## 2025-06-13 DIAGNOSIS — D49.2 SKIN NEOPLASM: Primary | ICD-10-CM

## 2025-06-13 PROCEDURE — 88305 TISSUE EXAM BY PATHOLOGIST: CPT | Performed by: PATHOLOGY

## 2025-06-13 PROCEDURE — 11306 SHAVE SKIN LESION 0.6-1.0 CM: CPT | Performed by: FAMILY MEDICINE

## 2025-06-13 PROCEDURE — 11301 SHAVE SKIN LESION 0.6-1.0 CM: CPT | Performed by: FAMILY MEDICINE

## 2025-06-13 NOTE — PROGRESS NOTES
"Name: Toby Izquierdo      : 1970      MRN: [id number]  Encounter Provider: Harsh Villareal DO  Encounter Date: 2025   Encounter department: [facility] PRIMARY CARE  :  Assessment & Plan  Skin neoplasm  See procedure note.  Patient will keep area clean and dry and use bacitracin daily.  Specimen sent to pathology.  Patient will be called with results.  Orders:    Tissue Exam    Tissue Exam           History of Present Illness   Patient is here for removal of skin lesion x 2.  Patient has noticed change in color with darkening of lesions on anterior left neck as well as left posterior shoulder.  Increase in size noted.      Review of Systems   Constitutional: Negative.    HENT: Negative.     Eyes: Negative.    Respiratory: Negative.     Cardiovascular: Negative.    Gastrointestinal: Negative.    Endocrine: Negative.    Genitourinary: Negative.    Musculoskeletal: Negative.    Skin:  Positive for color change.   Allergic/Immunologic: Negative.    Neurological: Negative.    Hematological: Negative.    Psychiatric/Behavioral: Negative.         Objective   /78 (BP Location: Left arm, Patient Position: Sitting, Cuff Size: Standard)   Pulse 74   Temp 98 °F (36.7 °C) (Temporal)   Ht 5' 9\" (1.753 m)   Wt 72.1 kg (159 lb)   SpO2 98%   BMI 23.48 kg/m²      Physical Exam  Constitutional:       General: He is not in acute distress.     Appearance: Normal appearance. He is not ill-appearing, toxic-appearing or diaphoretic.     Skin:     Findings: Lesion present.      Comments: Irregular raised black-brown lesion anterior left of midline neck lesion measuring 7 mm.  Raised black lesion measuring 8 mm left posterior shoulder     Neurological:      Mental Status: He is alert.     Shave lesion    Date/Time: 2025 12:30 PM    Performed by: Gonzalez Villareal DO  Authorized by: Gonzalez Villareal DO    Universal Protocol:  procedure performed by consultantConsent: Verbal consent obtained. Written consent " "obtained  Risks and benefits: risks, benefits and alternatives were discussed  Consent given by: patient  Time out: Immediately prior to procedure a \"time out\" was called to verify the correct patient, procedure, equipment, support staff and site/side marked as required.  Patient understanding: patient states understanding of the procedure being performed  Patient consent: the patient's understanding of the procedure matches consent given  Procedure consent: procedure consent matches procedure scheduled  Relevant documents: relevant documents present and verified  Patient identity confirmed: verbally with patient    Number of Lesions: 2  Lesion 1:     Body area: head/neck    Head/neck location: neck (Anterior left neck)    Initial size (mm): 7    Final defect size (mm): 7    Malignancy: malignancy unknown      Destruction method: shave removal    Lesion 2:     Body area: upper extremity    Upper extremity location: L shoulder (Posterior left shoulder)    Initial size (mm): 8    Final defect size (mm): 8    Malignancy: malignancy unknown      Destruction method: shave removal      Comments:  Informed consent obtained.  Betadine and alcohol use.  1 cc lidocaine with epinephrine used to each lesion.  Shave excision done for both lesions.  Electrodesiccation and curettage to both.  Bacitracin and dry sterile dressing applied.  Specimen sent to pathology.  Patient tolerated procedures well.        "

## 2025-06-13 NOTE — ASSESSMENT & PLAN NOTE
See procedure note.  Patient will keep area clean and dry and use bacitracin daily.  Specimen sent to pathology.  Patient will be called with results.  Orders:    Tissue Exam    Tissue Exam

## 2025-06-18 ENCOUNTER — RESULTS FOLLOW-UP (OUTPATIENT)
Age: 55
End: 2025-06-18

## 2025-06-18 PROCEDURE — 88305 TISSUE EXAM BY PATHOLOGIST: CPT | Performed by: PATHOLOGY

## (undated) DEVICE — EXIDINE 4 PCT

## (undated) DEVICE — SURGICAL CLIPPER BLADE GENERAL USE

## (undated) DEVICE — MINOR PROCEDURE DRAPE: Brand: CONVERTORS

## (undated) DEVICE — GLOVE INDICATOR PI UNDERGLOVE SZ 7 BLUE

## (undated) DEVICE — STRAIGHT CATH RED RUBBER 10FR

## (undated) DEVICE — BASKET SPECIMEN RETRIVAL 1.9FR 120CM

## (undated) DEVICE — PACK TUR

## (undated) DEVICE — ENDOSCOPIC VALVE WITH ADAPTER.: Brand: SURSEAL® II

## (undated) DEVICE — STERILE POLYISOPRENE POWDER-FREE SURGICAL GLOVES WITH EMOLLIENT COATING: Brand: PROTEXIS

## (undated) DEVICE — X-RAY DETECTABLE SPONGES,16 PLY: Brand: VISTEC

## (undated) DEVICE — DRAPE C-ARM X-RAY

## (undated) DEVICE — SPECIMEN CONTAINER STERILE PEEL PACK

## (undated) DEVICE — FIBER STD QUANTA 272 MICRON

## (undated) DEVICE — LIGHT GLOVE GREEN

## (undated) DEVICE — TUBING SUCTION 5MM X 12 FT

## (undated) DEVICE — LUBRICANT SURGILUBE TUBE 4 OZ  FLIP TOP

## (undated) DEVICE — NEEDLE 25G X 1 1/2

## (undated) DEVICE — SKIN MARKER DUAL TIP WITH RULER CAP, FLEXIBLE RULER AND LABELS: Brand: DEVON

## (undated) DEVICE — 4-PORT MANIFOLD: Brand: NEPTUNE 2

## (undated) DEVICE — UROLOGIC DRAIN BAG: Brand: UNBRANDED

## (undated) DEVICE — CATH URET .038 10FR 50CM DUAL LUMEN

## (undated) DEVICE — SYRINGE 50ML LL

## (undated) DEVICE — INVIEW CLEAR LEGGINGS: Brand: CONVERTORS

## (undated) DEVICE — NDL CNTR 20CT FM MAG: Brand: MEDLINE INDUSTRIES, INC.

## (undated) DEVICE — SPONGE STICK WITH PVP-I: Brand: KENDALL

## (undated) DEVICE — GUIDEWIRE STRGHT TIP 0.035 IN  SOLO PLUS

## (undated) DEVICE — STERILE POLYISOPRENE POWDER-FREE SURGICAL GLOVES: Brand: PROTEXIS

## (undated) DEVICE — BULB SYRINGE,IRRIGATION WITH PROTECTIVE CAP: Brand: DOVER

## (undated) DEVICE — SCD SEQUENTIAL COMPRESSION COMFORT SLEEVE MEDIUM KNEE LENGTH: Brand: KENDALL SCD

## (undated) DEVICE — YANKAUER,OPEN TIP, NO VENT: Brand: ARGYLE

## (undated) DEVICE — BASIC PACK: Brand: CONVERTORS

## (undated) DEVICE — NEEDLE BLUNT 18 G X 1 1/2IN

## (undated) DEVICE — SYRINGE 10ML LL CONTROL TOP

## (undated) DEVICE — TELFA NON-ADHERENT ABSORBENT DRESSING: Brand: TELFA

## (undated) DEVICE — INTENDED FOR TISSUE SEPARATION, AND OTHER PROCEDURES THAT REQUIRE A SHARP SURGICAL BLADE TO PUNCTURE OR CUT.: Brand: BARD-PARKER SAFETY BLADES SIZE 10, STERILE

## (undated) DEVICE — PREMIUM DRY TRAY LF: Brand: MEDLINE INDUSTRIES, INC.

## (undated) DEVICE — PAD GROUNDING ADULT

## (undated) DEVICE — PENCIL ELECTROSURG E-Z CLEAN -0035H